# Patient Record
Sex: MALE | Race: WHITE | NOT HISPANIC OR LATINO | URBAN - METROPOLITAN AREA
[De-identification: names, ages, dates, MRNs, and addresses within clinical notes are randomized per-mention and may not be internally consistent; named-entity substitution may affect disease eponyms.]

---

## 2018-04-11 ENCOUNTER — INPATIENT (INPATIENT)
Facility: HOSPITAL | Age: 83
LOS: 4 days | Discharge: HOSPICE | DRG: 871 | End: 2018-04-16
Attending: INTERNAL MEDICINE | Admitting: INTERNAL MEDICINE
Payer: MEDICARE

## 2018-04-11 VITALS
HEART RATE: 102 BPM | SYSTOLIC BLOOD PRESSURE: 106 MMHG | OXYGEN SATURATION: 99 % | RESPIRATION RATE: 18 BRPM | TEMPERATURE: 98 F | DIASTOLIC BLOOD PRESSURE: 70 MMHG

## 2018-04-11 DIAGNOSIS — Z29.9 ENCOUNTER FOR PROPHYLACTIC MEASURES, UNSPECIFIED: ICD-10-CM

## 2018-04-11 DIAGNOSIS — R94.31 ABNORMAL ELECTROCARDIOGRAM [ECG] [EKG]: ICD-10-CM

## 2018-04-11 DIAGNOSIS — A41.9 SEPSIS, UNSPECIFIED ORGANISM: ICD-10-CM

## 2018-04-11 DIAGNOSIS — R63.8 OTHER SYMPTOMS AND SIGNS CONCERNING FOOD AND FLUID INTAKE: ICD-10-CM

## 2018-04-11 DIAGNOSIS — F03.90 UNSPECIFIED DEMENTIA WITHOUT BEHAVIORAL DISTURBANCE: ICD-10-CM

## 2018-04-11 DIAGNOSIS — J18.9 PNEUMONIA, UNSPECIFIED ORGANISM: ICD-10-CM

## 2018-04-11 DIAGNOSIS — R29.6 REPEATED FALLS: ICD-10-CM

## 2018-04-11 LAB
ALBUMIN SERPL ELPH-MCNC: 2.9 G/DL — LOW (ref 3.3–5)
ALP SERPL-CCNC: 88 U/L — SIGNIFICANT CHANGE UP (ref 40–120)
ALT FLD-CCNC: 16 U/L — SIGNIFICANT CHANGE UP (ref 10–45)
ANION GAP SERPL CALC-SCNC: 12 MMOL/L — SIGNIFICANT CHANGE UP (ref 5–17)
ANISOCYTOSIS BLD QL: SLIGHT — SIGNIFICANT CHANGE UP
APTT BLD: 29.7 SEC — SIGNIFICANT CHANGE UP (ref 27.5–37.4)
AST SERPL-CCNC: 16 U/L — SIGNIFICANT CHANGE UP (ref 10–40)
BASOPHILS NFR BLD AUTO: 0 % — SIGNIFICANT CHANGE UP (ref 0–2)
BILIRUB SERPL-MCNC: 0.8 MG/DL — SIGNIFICANT CHANGE UP (ref 0.2–1.2)
BUN SERPL-MCNC: 29 MG/DL — HIGH (ref 7–23)
CALCIUM SERPL-MCNC: 9.5 MG/DL — SIGNIFICANT CHANGE UP (ref 8.4–10.5)
CHLORIDE SERPL-SCNC: 104 MMOL/L — SIGNIFICANT CHANGE UP (ref 96–108)
CO2 SERPL-SCNC: 25 MMOL/L — SIGNIFICANT CHANGE UP (ref 22–31)
CREAT SERPL-MCNC: 0.89 MG/DL — SIGNIFICANT CHANGE UP (ref 0.5–1.3)
EOSINOPHIL NFR BLD AUTO: 0 % — SIGNIFICANT CHANGE UP (ref 0–6)
EXTRA SST TUBE: SIGNIFICANT CHANGE UP
GIANT PLATELETS BLD QL SMEAR: PRESENT — SIGNIFICANT CHANGE UP
GLUCOSE SERPL-MCNC: 116 MG/DL — HIGH (ref 70–99)
HCT VFR BLD CALC: 42.9 % — SIGNIFICANT CHANGE UP (ref 39–50)
HGB BLD-MCNC: 13.7 G/DL — SIGNIFICANT CHANGE UP (ref 13–17)
INR BLD: 1.33 — HIGH (ref 0.88–1.16)
LG PLATELETS BLD QL AUTO: PRESENT — SIGNIFICANT CHANGE UP
LYMPHOCYTES # BLD AUTO: 18 % — SIGNIFICANT CHANGE UP (ref 13–44)
MACROCYTES BLD QL: SLIGHT — SIGNIFICANT CHANGE UP
MANUAL DIF COMMENT BLD-IMP: SIGNIFICANT CHANGE UP
MANUAL SMEAR VERIFICATION: SIGNIFICANT CHANGE UP
MCHC RBC-ENTMCNC: 29.2 PG — SIGNIFICANT CHANGE UP (ref 27–34)
MCHC RBC-ENTMCNC: 31.9 G/DL — LOW (ref 32–36)
MCV RBC AUTO: 91.5 FL — SIGNIFICANT CHANGE UP (ref 80–100)
METAMYELOCYTES # FLD: 1 % — HIGH
MICROCYTES BLD QL: SLIGHT — SIGNIFICANT CHANGE UP
MONOCYTES NFR BLD AUTO: 6 % — SIGNIFICANT CHANGE UP (ref 2–14)
NEUTROPHILS NFR BLD AUTO: 38 % — LOW (ref 43–77)
NEUTS BAND # BLD: 37 % — HIGH
OVALOCYTES BLD QL SMEAR: SLIGHT — SIGNIFICANT CHANGE UP
PLAT MORPH BLD: (no result)
PLATELET # BLD AUTO: 202 K/UL — SIGNIFICANT CHANGE UP (ref 150–400)
POIKILOCYTOSIS BLD QL AUTO: SLIGHT — SIGNIFICANT CHANGE UP
POLYCHROMASIA BLD QL SMEAR: SLIGHT — SIGNIFICANT CHANGE UP
POTASSIUM SERPL-MCNC: 4.8 MMOL/L — SIGNIFICANT CHANGE UP (ref 3.5–5.3)
POTASSIUM SERPL-SCNC: 4.8 MMOL/L — SIGNIFICANT CHANGE UP (ref 3.5–5.3)
PROT SERPL-MCNC: 6.8 G/DL — SIGNIFICANT CHANGE UP (ref 6–8.3)
PROTHROM AB SERPL-ACNC: 14.8 SEC — HIGH (ref 9.8–12.7)
RAPID RVP RESULT: SIGNIFICANT CHANGE UP
RBC # BLD: 4.69 M/UL — SIGNIFICANT CHANGE UP (ref 4.2–5.8)
RBC # FLD: 15.3 % — SIGNIFICANT CHANGE UP (ref 10.3–16.9)
RBC BLD AUTO: (no result)
SODIUM SERPL-SCNC: 141 MMOL/L — SIGNIFICANT CHANGE UP (ref 135–145)
WBC # BLD: 8.2 K/UL — SIGNIFICANT CHANGE UP (ref 3.8–10.5)
WBC # FLD AUTO: 8.2 K/UL — SIGNIFICANT CHANGE UP (ref 3.8–10.5)

## 2018-04-11 PROCEDURE — 99223 1ST HOSP IP/OBS HIGH 75: CPT | Mod: GC

## 2018-04-11 PROCEDURE — 71046 X-RAY EXAM CHEST 2 VIEWS: CPT | Mod: 26

## 2018-04-11 PROCEDURE — 99285 EMERGENCY DEPT VISIT HI MDM: CPT | Mod: 25

## 2018-04-11 PROCEDURE — 93010 ELECTROCARDIOGRAM REPORT: CPT

## 2018-04-11 RX ORDER — AMPICILLIN SODIUM AND SULBACTAM SODIUM 250; 125 MG/ML; MG/ML
1.5 INJECTION, POWDER, FOR SUSPENSION INTRAMUSCULAR; INTRAVENOUS ONCE
Qty: 0 | Refills: 0 | Status: COMPLETED | OUTPATIENT
Start: 2018-04-11 | End: 2018-04-11

## 2018-04-11 RX ORDER — SODIUM CHLORIDE 9 MG/ML
1000 INJECTION INTRAMUSCULAR; INTRAVENOUS; SUBCUTANEOUS ONCE
Qty: 0 | Refills: 0 | Status: COMPLETED | OUTPATIENT
Start: 2018-04-11 | End: 2018-04-11

## 2018-04-11 RX ORDER — AMPICILLIN SODIUM AND SULBACTAM SODIUM 250; 125 MG/ML; MG/ML
1.5 INJECTION, POWDER, FOR SUSPENSION INTRAMUSCULAR; INTRAVENOUS EVERY 6 HOURS
Qty: 0 | Refills: 0 | Status: DISCONTINUED | OUTPATIENT
Start: 2018-04-12 | End: 2018-04-12

## 2018-04-11 RX ORDER — AMPICILLIN SODIUM AND SULBACTAM SODIUM 250; 125 MG/ML; MG/ML
INJECTION, POWDER, FOR SUSPENSION INTRAMUSCULAR; INTRAVENOUS
Qty: 0 | Refills: 0 | Status: DISCONTINUED | OUTPATIENT
Start: 2018-04-11 | End: 2018-04-12

## 2018-04-11 RX ORDER — CEFTRIAXONE 500 MG/1
1 INJECTION, POWDER, FOR SOLUTION INTRAMUSCULAR; INTRAVENOUS ONCE
Qty: 0 | Refills: 0 | Status: COMPLETED | OUTPATIENT
Start: 2018-04-11 | End: 2018-04-11

## 2018-04-11 RX ORDER — SODIUM CHLORIDE 9 MG/ML
1000 INJECTION INTRAMUSCULAR; INTRAVENOUS; SUBCUTANEOUS
Qty: 0 | Refills: 0 | Status: DISCONTINUED | OUTPATIENT
Start: 2018-04-11 | End: 2018-04-12

## 2018-04-11 RX ORDER — AZITHROMYCIN 500 MG/1
500 TABLET, FILM COATED ORAL ONCE
Qty: 0 | Refills: 0 | Status: COMPLETED | OUTPATIENT
Start: 2018-04-11 | End: 2018-04-11

## 2018-04-11 RX ORDER — HEPARIN SODIUM 5000 [USP'U]/ML
5000 INJECTION INTRAVENOUS; SUBCUTANEOUS EVERY 8 HOURS
Qty: 0 | Refills: 0 | Status: DISCONTINUED | OUTPATIENT
Start: 2018-04-11 | End: 2018-04-16

## 2018-04-11 RX ADMIN — HEPARIN SODIUM 5000 UNIT(S): 5000 INJECTION INTRAVENOUS; SUBCUTANEOUS at 22:29

## 2018-04-11 RX ADMIN — CEFTRIAXONE 100 GRAM(S): 500 INJECTION, POWDER, FOR SOLUTION INTRAMUSCULAR; INTRAVENOUS at 14:01

## 2018-04-11 RX ADMIN — AMPICILLIN SODIUM AND SULBACTAM SODIUM 100 GRAM(S): 250; 125 INJECTION, POWDER, FOR SUSPENSION INTRAMUSCULAR; INTRAVENOUS at 22:29

## 2018-04-11 RX ADMIN — SODIUM CHLORIDE 2000 MILLILITER(S): 9 INJECTION INTRAMUSCULAR; INTRAVENOUS; SUBCUTANEOUS at 15:45

## 2018-04-11 RX ADMIN — AZITHROMYCIN 255 MILLIGRAM(S): 500 TABLET, FILM COATED ORAL at 14:34

## 2018-04-11 RX ADMIN — SODIUM CHLORIDE 125 MILLILITER(S): 9 INJECTION INTRAMUSCULAR; INTRAVENOUS; SUBCUTANEOUS at 21:15

## 2018-04-11 RX ADMIN — SODIUM CHLORIDE 2000 MILLILITER(S): 9 INJECTION INTRAMUSCULAR; INTRAVENOUS; SUBCUTANEOUS at 13:53

## 2018-04-11 NOTE — H&P ADULT - NSHPPHYSICALEXAM_GEN_ALL_CORE
.  VITAL SIGNS:  T(F): 99.8 (04-11-18 @ 17:30), Max: 99.8 (04-11-18 @ 17:30)  HR: 77 (04-11-18 @ 17:30) (71 - 102)  BP: 103/65 (04-11-18 @ 17:30) (103/65 - 114/71)  BP(mean): --  RR: 14 (04-11-18 @ 17:30) (14 - 18)  SpO2: 94% (04-11-18 @ 17:30) (92% - 99%)    PHYSICAL EXAM:    Constitutional: Cachectic, frail, elderly male resting comfortably in bed; NAD  HEENT: NC/AT, PERRL, EOMI, anicteric sclera, arcus senalis, no nasal discharge; uvula midline, no oropharyngeal erythema or exudates; MMM  Neck: supple; no JVD or thyromegaly  Respiratory: crackles noted in R lower lung field, otherwise clear to auscultation bilaterally with good air movement, no accessory muscle use, no retractions  Cardiac: +S1/S2; RRR; no M/R/G; PMI non-displaced  Gastrointestinal: soft, NT/ND; no rebound or guarding; +BSx4  Genitourinary: normal external genitalia  Back: spine midline, no bony tenderness or step-offs; no CVAT B/L  Extremities: WWP, no clubbing or cyanosis; no peripheral edema  Musculoskeletal: NROM x4 passive, patient does not follow full commands; no joint swelling, tenderness or erythema  Vascular: 2+ radial, femoral, DP/PT pulses B/L  Dermatologic: skin warm, dry and intact; no rashes +wound on r knee clean, multiple bruises without palpable ecchymosis  Lymphatic: no submandibular or cervical LAD  Neurologic: AAOx0, grunting responses, CNII-XII grossly intact; no focal deficits; lethargic

## 2018-04-11 NOTE — ED PROVIDER NOTE - MEDICAL DECISION MAKING DETAILS
Pt c/o cough/sob/pleuritic cp w rll infiltrate on cxr at urgent care.  Pt appears dehydrated, mildly tachypneic.  Plan labs, cxr disc sent to rad for upload, abx/ivf ordered; likely admit.

## 2018-04-11 NOTE — ED PROVIDER NOTE - OBJECTIVE STATEMENT
87 yo male h/o dementia c/o cough x several days.  Pt went to urgent care and had cxr w rll pna, sent to ed for eval and admission.  Per hha and daughter, pt w sputum but is not coughing it out.  No fever, uri sx, sick contacts, travel. + increased weakness and less interactivity than baseline.  Nl po's.  C/o cp when coughing.  No ha, abd pain, n/v/d, dysuria.  HHA concerned pt not able to take po meds but has been taking his trazadone and haldol w/o difficulty.  Pt given ceftriaxone 1 gm IM at urgent care and rx for cefdinir and azithromycin.

## 2018-04-11 NOTE — H&P ADULT - PROBLEM SELECTOR PLAN 1
Patient with productive cough for three days, change in mental status, and bandemia to 37% and CXR with RLL consolidation suspicious for pneumonia on my read. Patient has some clearing of throat with food per daughter and HHA, possible aspiration with workup as below. DDX also includes viral (unliklely given negative RVP), advancing dementia, or underlying malignancy given weight loss. However given changing diet 2/2 dysphagia and possible aspiration, lethargy and weight loss are liklely linked. Will treat for pneumonia and monitor patient overnight for convalesence,   - Unasyn 1.5 gm IV q6hrs  - Aspiration precautions Patient with productive cough for three days, change in mental status, and bandemia to 37% and CXR with RLL consolidation suspicious for pneumonia on my read. Patient has some clearing of throat with food per daughter and HHA, possible aspiration with workup as below. DDX also includes viral (unliklely given negative RVP), advancing dementia, or underlying malignancy given weight loss. However given changing diet 2/2 dysphagia and possible aspiration, lethargy and weight loss are likely linked. Will treat for pneumonia and monitor patient overnight for convalescence. s/p Ceftriaxone IM 1gm at urgent care, then ceftriaxone 1gm and azithro 500 IV in ED  - Unasyn 1.5 gm IV q6hrs  - Aspiration precautions 2/4 SIRS criteria (bandemia, tachycardia), with pneumonia on hx and cxr. Patient with productive cough for three days, change in mental status, and bandemia to 37% and CXR with RLL consolidation suspicious for pneumonia on my read. Patient has some clearing of throat with food per daughter and HHA, possible aspiration with workup as below. DDX also includes viral (unliklely given negative RVP), advancing dementia, or underlying malignancy given weight loss. However given changing diet 2/2 dysphagia and possible aspiration, lethargy and weight loss are likely linked. Will treat for pneumonia and monitor patient overnight for convalescence. s/p Ceftriaxone IM 1gm at urgent care, then ceftriaxone 1gm and azithro 500 IV in ED  - Unasyn 1.5 gm IV q6hrs  - Aspiration precautions

## 2018-04-11 NOTE — H&P ADULT - PROBLEM SELECTOR PROBLEM 5
Need for prophylactic measure Nutrition, metabolism, and development symptoms R/O Aspiration of food

## 2018-04-11 NOTE — H&P ADULT - PROBLEM SELECTOR PLAN 5
Padua score 5, chemical ppx indicated, Heparin SQ 5000units Q8hrs  No indication for stress ulcer ppx at this time    Dispo: Admit to [facility] Padua score 5, chemical ppx indicated, Heparin SQ 5000units Q8hrs  No indication for stress ulcer ppx at this time    FULL CODE - confirmed with HCP daughter Jane Logan [phone]    Dispo: Admit to [facility] NPO awaiting s/s eval  Continue NS at 125 for hydration  Replete lytes to keep K>4 and Mg>2 Patient with history of clearing throat and coughing when eating, holding food in mouth, only able to drink thin liquids and raw fish. Concern for aspiration  - NPO until S/S eval  - f/u S/S recs  - Aspiration precautions

## 2018-04-11 NOTE — H&P ADULT - PROBLEM SELECTOR PLAN 4
NPO awaiting s/s eval  Continue NS at 125 for hydration  Replete lytes to keep K>4 and Mg>2 Fall precautions, PT as above. Fall precautions, PT as above.  - f/u CT Head  - f/u B12, rpr, TSH, t4 Patient with history of advanced dementia, baseline interactive, stands and walks with assistance, has been lethargic lately. AOx0 per daughter and HHA  - home meds of Trazadone and haldol being held for current lethargy  - check EKG for qtc prolongation  - Fall precautions  - Low threshold for enhanced obs if patient is attempting to get up  - PT eval  - Palliative consult for advanced dementia  - vitamin D level in AM, replete with high dose if low otherwise will start PO vitamin D if pass s/s eval

## 2018-04-11 NOTE — H&P ADULT - PROBLEM SELECTOR PLAN 6
Padua score 5, chemical ppx indicated, Heparin SQ 5000units Q8hrs  No indication for stress ulcer ppx at this time    FULL CODE - confirmed with HCP daughter Jane Logan [phone]    Dispo: Admit to [facility] Padua score 5, chemical ppx indicated, if CT head clear Heparin SQ 5000units Q8hrs  No indication for stress ulcer ppx at this time    FULL CODE - confirmed with HCP daughter Jane Logan [phone]    Dispo: Admit to [facility] Fall precautions, PT as above.  - f/u CT Head  - f/u B12, rpr, TSH, t4

## 2018-04-11 NOTE — H&P ADULT - PROBLEM SELECTOR PLAN 8
Padua score 5, chemical ppx indicated, if CT head clear Heparin SQ 5000units Q8hrs  No indication for stress ulcer ppx at this time    FULL CODE - confirmed with HCP daughter Jane Logan [phone]    Dispo: Admit to [facility]

## 2018-04-11 NOTE — ED PROVIDER NOTE - NEURO NEGATIVE STATEMENT, MLM
no loss of consciousness, no gait abnormality, no headache, no sensory deficits, and + generalized weakness. hpi

## 2018-04-11 NOTE — H&P ADULT - PROBLEM SELECTOR PLAN 3
Patient with history of clearing throat and coughing when eating, holding food in mouth, only able to drink thin liquids and raw fish. Concern for aspiration  - NPO until S/S eval  - f/u S/S recs  - Aspiration precautions As above

## 2018-04-11 NOTE — H&P ADULT - PROBLEM SELECTOR PLAN 2
Patient with history of advanced dementia, baseline interactive, stands and walks with assistance, has been lethargic lately. AOx0 per daughter and HHA  - home meds of Trazadone and haldol being held for current lethargy  - check EKG for qtc prolongation Patient with history of advanced dementia, baseline interactive, stands and walks with assistance, has been lethargic lately. AOx0 per daughter and HHA  - home meds of Trazadone and haldol being held for current lethargy  - check EKG for qtc prolongation  - Fall precautions  - Low threshold for enhanced obs if patient is attempting to get up  - PT eval I have read the EKG as follows: Sinus rhythm with widened qrs likely bifascicular block, t wave in v1 only noted, no signs of prior ischemia such as t waves or st depressions, no signs of current ischemia.

## 2018-04-11 NOTE — H&P ADULT - ASSESSMENT
Patient is an 89 yo M with history of advanced dementia who presents with cough, admitted with concerns of pneumonia vs aspiration pneumonia

## 2018-04-11 NOTE — H&P ADULT - HISTORY OF PRESENT ILLNESS
*History provided by daughter and HHA at bedside. Patient is an 89 yo M with history of advanced dementia, *History provided by daughter and HHA at bedside. Patient is an 87 yo M with history of advanced dementia who presents with several days of cough and lethargy. At baseline patient has lucid moments rarely, but is interactive and walks with assistance (does not speak anymore), and for the past three or four days has been lethargic and not as interactive, and with cough for the same amount of time. Today the patient was seen to be sleeping most of the day and having more productive cough so was brought to urgent care which then sent patient to ED for infiltrates suspicious for PNA with AMS. Patient otherwise was in usual state of health, although it was noted by daughter and HHA that patient has lost a lot of weight recently, now looks much thinner than 2-4 weeks ago, and has been falling more often. Patient has not had any head injuries but has had multiple bumps and has had a wound on his right knee that has not yet healed. Otherwise ROS per HHA and daughter is negative. *History provided by daughter and HHA at bedside. Patient is an 89 yo M with history of advanced dementia who presents with several days of cough and lethargy. At baseline patient has lucid moments rarely, but is interactive and walks with assistance (does not speak anymore), and for the past three or four days has been lethargic and not as interactive, and with cough for the same amount of time. Today the patient was seen to be sleeping most of the day and having more productive cough so was brought to urgent care which then sent patient to ED for infiltrates suspicious for PNA with AMS. Patient otherwise was in usual state of health, although it was noted by daughter and HHA that patient has lost a lot of weight recently, now looks much thinner than 2-4 weeks ago, and has been falling more often. Patient has not had any head injuries but has had multiple bumps and has had a wound on his right knee that has not yet healed. Otherwise ROS per HHA and daughter is negative. S/p 1gm IM ceftriaxone at urgent care center.     ED vitals: T(F): 99.8 HR: 77 BP: 103/65  RR: 14SpO2: 94%   ED administration: Ceftriaxone 1gm IV, Azithromycin 500mg IV. 2L NS bolus

## 2018-04-11 NOTE — PATIENT PROFILE ADULT. - VISION (WITH CORRECTIVE LENSES IF THE PATIENT USUALLY WEARS THEM):
Severely impaired: cannot locate objects without hearing or touching them or patient nonresponsive./Unable to assess. Patient lethargic upon admission to unit

## 2018-04-11 NOTE — PATIENT PROFILE ADULT. - FUNCTIONAL SCREEN CURRENT LEVEL: COMMUNICATION, MLM
(3) unable to understand or speak (not related to language barrier)/Unable to assess. Patient lethargic upon admission to unit

## 2018-04-11 NOTE — H&P ADULT - NSHPLABSRESULTS_GEN_ALL_CORE
.  LABS:                         13.7   8.2   )-----------( 202      ( 11 Apr 2018 13:36 )             42.9     04-11    141  |  104  |  29<H>  ----------------------------<  116<H>  4.8   |  25  |  0.89    Ca    9.5      11 Apr 2018 13:36    TPro  6.8  /  Alb  2.9<L>  /  TBili  0.8  /  DBili  x   /  AST  16  /  ALT  16  /  AlkPhos  88  04-11    PT/INR - ( 11 Apr 2018 13:36 )   PT: 14.8 sec;   INR: 1.33          PTT - ( 11 Apr 2018 13:36 )  PTT:29.7 sec              RADIOLOGY, EKG & ADDITIONAL TESTS:   Outside CXR reviewed by myself: PA view: no cardiomegaly, left hemidiaphragm elevated with stomach bubble and bowel seen beneath no free air. Enlarged mediastinum. RLL consolidation.

## 2018-04-11 NOTE — ED ADULT TRIAGE NOTE - OTHER COMPLAINTS
pt with productive cough since yesterday. pt had episode of chest pain this morning. hx of dementia, nonverbal at times, appears weak per family.

## 2018-04-11 NOTE — ED ADULT NURSE NOTE - OBJECTIVE STATEMENT
referred from urgent care post CXR which showed right lung base infiltrate, pt unwell x 4 days with expectorant cough ,SOB and weakness referred from urgent care post CXR which showed right lung base infiltrate, pt unwell x 4 days with expectorant cough ,SOB , chest pain and weakness

## 2018-04-11 NOTE — H&P ADULT - ATTENDING COMMENTS
Pt seen and examined at bedside on 4/11/2018 @ 2100    Agree with HPI, ROS as above.     VS, Labs, FH, SH, allergies, medications, imaging reviewed. I personally reviewed the patient's CXR - RLL infiltrate. 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    **Sepsis  -Pt meeting 2/4 SIRS criteria HR, bandemia  -Will get patient weight to see if appropriately fluid resuscitated  -No LA drawn in ED  -Will f/u BCx  -Concern for aspiration - will switch abx to Unasyn  -Tylenol PRN for fever  -RVP negative    Plan otherwise as outlined above..... Pt seen and examined at bedside on 4/11/2018 @ 2100    Agree with HPI, ROS as above.     VS, Labs, FH, SH, allergies, medications, imaging reviewed. I personally reviewed the patient's CXR - RLL infiltrate. Agree with physical exam as above     A/P: Patient is an 89 yo M with history of advanced dementia who presents with cough, admitted with concerns of pneumonia vs aspiration pneumonia    **Sepsis  -Pt meeting 2/4 SIRS criteria HR, bandemia  -Will get patient weight to see if appropriately fluid resuscitated  -No LA drawn in ED  -Will f/u BCx  -Concern for aspiration - will switch abx to Unasyn  -Tylenol PRN for fever  -RVP negative    Plan otherwise as outlined above.....

## 2018-04-12 DIAGNOSIS — Z71.89 OTHER SPECIFIED COUNSELING: ICD-10-CM

## 2018-04-12 DIAGNOSIS — T17.890A OTHER FOREIGN OBJECT IN OTHER PARTS OF RESPIRATORY TRACT CAUSING ASPHYXIATION, INITIAL ENCOUNTER: ICD-10-CM

## 2018-04-12 DIAGNOSIS — J69.0 PNEUMONITIS DUE TO INHALATION OF FOOD AND VOMIT: ICD-10-CM

## 2018-04-12 DIAGNOSIS — G30.9 ALZHEIMER'S DISEASE, UNSPECIFIED: ICD-10-CM

## 2018-04-12 DIAGNOSIS — R06.82 TACHYPNEA, NOT ELSEWHERE CLASSIFIED: ICD-10-CM

## 2018-04-12 DIAGNOSIS — Z51.5 ENCOUNTER FOR PALLIATIVE CARE: ICD-10-CM

## 2018-04-12 LAB
24R-OH-CALCIDIOL SERPL-MCNC: 33.6 NG/ML — SIGNIFICANT CHANGE UP (ref 30–80)
ALBUMIN SERPL ELPH-MCNC: 2.3 G/DL — LOW (ref 3.3–5)
ALP SERPL-CCNC: 71 U/L — SIGNIFICANT CHANGE UP (ref 40–120)
ALT FLD-CCNC: 11 U/L — SIGNIFICANT CHANGE UP (ref 10–45)
ANION GAP SERPL CALC-SCNC: 11 MMOL/L — SIGNIFICANT CHANGE UP (ref 5–17)
AST SERPL-CCNC: 15 U/L — SIGNIFICANT CHANGE UP (ref 10–40)
BASE EXCESS BLDA CALC-SCNC: -1.3 MMOL/L — SIGNIFICANT CHANGE UP (ref -2–3)
BASE EXCESS BLDA CALC-SCNC: -2.6 MMOL/L — LOW (ref -2–3)
BASE EXCESS BLDA CALC-SCNC: -2.8 MMOL/L — LOW (ref -2–3)
BASOPHILS NFR BLD AUTO: 0.2 % — SIGNIFICANT CHANGE UP (ref 0–2)
BILIRUB SERPL-MCNC: 0.7 MG/DL — SIGNIFICANT CHANGE UP (ref 0.2–1.2)
BUN SERPL-MCNC: 21 MG/DL — SIGNIFICANT CHANGE UP (ref 7–23)
CALCIUM SERPL-MCNC: 8.6 MG/DL — SIGNIFICANT CHANGE UP (ref 8.4–10.5)
CHLORIDE SERPL-SCNC: 109 MMOL/L — HIGH (ref 96–108)
CO2 SERPL-SCNC: 22 MMOL/L — SIGNIFICANT CHANGE UP (ref 22–31)
CREAT SERPL-MCNC: 0.67 MG/DL — SIGNIFICANT CHANGE UP (ref 0.5–1.3)
GAS PNL BLDA: SIGNIFICANT CHANGE UP
GLUCOSE SERPL-MCNC: 81 MG/DL — SIGNIFICANT CHANGE UP (ref 70–99)
HCO3 BLDA-SCNC: 17 MMOL/L — LOW (ref 21–28)
HCO3 BLDA-SCNC: 18 MMOL/L — LOW (ref 21–28)
HCO3 BLDA-SCNC: 20 MMOL/L — LOW (ref 21–28)
HCT VFR BLD CALC: 39 % — SIGNIFICANT CHANGE UP (ref 39–50)
HGB BLD-MCNC: 12.5 G/DL — LOW (ref 13–17)
LACTATE SERPL-SCNC: 1.2 MMOL/L — SIGNIFICANT CHANGE UP (ref 0.5–2)
LACTATE SERPL-SCNC: 2.6 MMOL/L — HIGH (ref 0.5–2)
LEGIONELLA AG UR QL: NEGATIVE — SIGNIFICANT CHANGE UP
LYMPHOCYTES # BLD AUTO: 29.1 % — SIGNIFICANT CHANGE UP (ref 13–44)
MAGNESIUM SERPL-MCNC: 2.1 MG/DL — SIGNIFICANT CHANGE UP (ref 1.6–2.6)
MCHC RBC-ENTMCNC: 29.3 PG — SIGNIFICANT CHANGE UP (ref 27–34)
MCHC RBC-ENTMCNC: 32.1 G/DL — SIGNIFICANT CHANGE UP (ref 32–36)
MCV RBC AUTO: 91.3 FL — SIGNIFICANT CHANGE UP (ref 80–100)
MONOCYTES NFR BLD AUTO: 8.1 % — SIGNIFICANT CHANGE UP (ref 2–14)
NEUTROPHILS NFR BLD AUTO: 62.6 % — SIGNIFICANT CHANGE UP (ref 43–77)
PCO2 BLDA: 19 MMHG — LOW (ref 35–48)
PCO2 BLDA: 21 MMHG — LOW (ref 35–48)
PCO2 BLDA: 28 MMHG — LOW (ref 35–48)
PH BLDA: 7.47 — HIGH (ref 7.35–7.45)
PH BLDA: 7.56 — HIGH (ref 7.35–7.45)
PH BLDA: 7.57 — HIGH (ref 7.35–7.45)
PHOSPHATE SERPL-MCNC: 2.1 MG/DL — LOW (ref 2.5–4.5)
PLATELET # BLD AUTO: 186 K/UL — SIGNIFICANT CHANGE UP (ref 150–400)
PO2 BLDA: 108 MMHG — SIGNIFICANT CHANGE UP (ref 83–108)
PO2 BLDA: 124 MMHG — HIGH (ref 83–108)
PO2 BLDA: 91 MMHG — SIGNIFICANT CHANGE UP (ref 83–108)
POTASSIUM SERPL-MCNC: 3.7 MMOL/L — SIGNIFICANT CHANGE UP (ref 3.5–5.3)
POTASSIUM SERPL-SCNC: 3.7 MMOL/L — SIGNIFICANT CHANGE UP (ref 3.5–5.3)
PREALB SERPL-MCNC: 4 MG/DL — LOW (ref 20–40)
PROT SERPL-MCNC: 5.6 G/DL — LOW (ref 6–8.3)
RBC # BLD: 4.27 M/UL — SIGNIFICANT CHANGE UP (ref 4.2–5.8)
RBC # FLD: 15.2 % — SIGNIFICANT CHANGE UP (ref 10.3–16.9)
SAO2 % BLDA: 98 % — SIGNIFICANT CHANGE UP (ref 95–100)
SAO2 % BLDA: 98 % — SIGNIFICANT CHANGE UP (ref 95–100)
SAO2 % BLDA: 99 % — SIGNIFICANT CHANGE UP (ref 95–100)
SODIUM SERPL-SCNC: 142 MMOL/L — SIGNIFICANT CHANGE UP (ref 135–145)
T4 AB SER-ACNC: 4.6 UG/DL — SIGNIFICANT CHANGE UP (ref 3.17–11.72)
TSH SERPL-MCNC: 0.7 UIU/ML — SIGNIFICANT CHANGE UP (ref 0.35–4.94)
WBC # BLD: 5.4 K/UL — SIGNIFICANT CHANGE UP (ref 3.8–10.5)
WBC # FLD AUTO: 5.4 K/UL — SIGNIFICANT CHANGE UP (ref 3.8–10.5)

## 2018-04-12 PROCEDURE — 99233 SBSQ HOSP IP/OBS HIGH 50: CPT | Mod: GC

## 2018-04-12 PROCEDURE — 99223 1ST HOSP IP/OBS HIGH 75: CPT

## 2018-04-12 PROCEDURE — 71045 X-RAY EXAM CHEST 1 VIEW: CPT | Mod: 26,76

## 2018-04-12 RX ORDER — HALOPERIDOL DECANOATE 100 MG/ML
1 INJECTION INTRAMUSCULAR THREE TIMES A DAY
Qty: 0 | Refills: 0 | Status: DISCONTINUED | OUTPATIENT
Start: 2018-04-12 | End: 2018-04-13

## 2018-04-12 RX ORDER — HALOPERIDOL DECANOATE 100 MG/ML
0.5 INJECTION INTRAMUSCULAR THREE TIMES A DAY
Qty: 0 | Refills: 0 | Status: DISCONTINUED | OUTPATIENT
Start: 2018-04-12 | End: 2018-04-12

## 2018-04-12 RX ORDER — POTASSIUM PHOSPHATE, MONOBASIC POTASSIUM PHOSPHATE, DIBASIC 236; 224 MG/ML; MG/ML
15 INJECTION, SOLUTION INTRAVENOUS ONCE
Qty: 0 | Refills: 0 | Status: COMPLETED | OUTPATIENT
Start: 2018-04-12 | End: 2018-04-12

## 2018-04-12 RX ORDER — TRAZODONE HCL 50 MG
0 TABLET ORAL
Qty: 0 | Refills: 0 | COMMUNITY

## 2018-04-12 RX ORDER — QUETIAPINE FUMARATE 200 MG/1
25 TABLET, FILM COATED ORAL ONCE
Qty: 0 | Refills: 0 | Status: COMPLETED | OUTPATIENT
Start: 2018-04-12 | End: 2018-04-12

## 2018-04-12 RX ORDER — HALOPERIDOL DECANOATE 100 MG/ML
0 INJECTION INTRAMUSCULAR
Qty: 0 | Refills: 0 | COMMUNITY

## 2018-04-12 RX ORDER — SODIUM CHLORIDE 9 MG/ML
1000 INJECTION, SOLUTION INTRAVENOUS
Qty: 0 | Refills: 0 | Status: DISCONTINUED | OUTPATIENT
Start: 2018-04-12 | End: 2018-04-16

## 2018-04-12 RX ORDER — AMPICILLIN SODIUM AND SULBACTAM SODIUM 250; 125 MG/ML; MG/ML
3 INJECTION, POWDER, FOR SUSPENSION INTRAMUSCULAR; INTRAVENOUS EVERY 6 HOURS
Qty: 0 | Refills: 0 | Status: DISCONTINUED | OUTPATIENT
Start: 2018-04-12 | End: 2018-04-16

## 2018-04-12 RX ORDER — ACETAMINOPHEN 500 MG
1000 TABLET ORAL ONCE
Qty: 0 | Refills: 0 | Status: COMPLETED | OUTPATIENT
Start: 2018-04-12 | End: 2018-04-12

## 2018-04-12 RX ORDER — POTASSIUM CHLORIDE 20 MEQ
20 PACKET (EA) ORAL ONCE
Qty: 0 | Refills: 0 | Status: DISCONTINUED | OUTPATIENT
Start: 2018-04-12 | End: 2018-04-12

## 2018-04-12 RX ADMIN — HEPARIN SODIUM 5000 UNIT(S): 5000 INJECTION INTRAVENOUS; SUBCUTANEOUS at 05:14

## 2018-04-12 RX ADMIN — AMPICILLIN SODIUM AND SULBACTAM SODIUM 200 GRAM(S): 250; 125 INJECTION, POWDER, FOR SUSPENSION INTRAMUSCULAR; INTRAVENOUS at 19:03

## 2018-04-12 RX ADMIN — Medication 400 MILLIGRAM(S): at 17:35

## 2018-04-12 RX ADMIN — QUETIAPINE FUMARATE 25 MILLIGRAM(S): 200 TABLET, FILM COATED ORAL at 19:38

## 2018-04-12 RX ADMIN — HALOPERIDOL DECANOATE 0.5 MILLIGRAM(S): 100 INJECTION INTRAMUSCULAR at 12:51

## 2018-04-12 RX ADMIN — HEPARIN SODIUM 5000 UNIT(S): 5000 INJECTION INTRAVENOUS; SUBCUTANEOUS at 22:34

## 2018-04-12 RX ADMIN — SODIUM CHLORIDE 80 MILLILITER(S): 9 INJECTION, SOLUTION INTRAVENOUS at 22:34

## 2018-04-12 RX ADMIN — SODIUM CHLORIDE 125 MILLILITER(S): 9 INJECTION INTRAMUSCULAR; INTRAVENOUS; SUBCUTANEOUS at 07:05

## 2018-04-12 RX ADMIN — AMPICILLIN SODIUM AND SULBACTAM SODIUM 100 GRAM(S): 250; 125 INJECTION, POWDER, FOR SUSPENSION INTRAMUSCULAR; INTRAVENOUS at 04:48

## 2018-04-12 RX ADMIN — SODIUM CHLORIDE 80 MILLILITER(S): 9 INJECTION, SOLUTION INTRAVENOUS at 19:03

## 2018-04-12 RX ADMIN — HEPARIN SODIUM 5000 UNIT(S): 5000 INJECTION INTRAVENOUS; SUBCUTANEOUS at 13:14

## 2018-04-12 RX ADMIN — AMPICILLIN SODIUM AND SULBACTAM SODIUM 200 GRAM(S): 250; 125 INJECTION, POWDER, FOR SUSPENSION INTRAMUSCULAR; INTRAVENOUS at 13:14

## 2018-04-12 RX ADMIN — POTASSIUM PHOSPHATE, MONOBASIC POTASSIUM PHOSPHATE, DIBASIC 62.5 MILLIMOLE(S): 236; 224 INJECTION, SOLUTION INTRAVENOUS at 09:31

## 2018-04-12 RX ADMIN — HALOPERIDOL DECANOATE 1 MILLIGRAM(S): 100 INJECTION INTRAMUSCULAR at 19:38

## 2018-04-12 NOTE — CONSULT NOTE ADULT - PROBLEM SELECTOR RECOMMENDATION 9
awaiting sp/swallow eval, on day 2 of abx, continue with management by primary team.  d/w daughter on phone call: described etiology and chronicity of aspiration in ad. dementia.  She understands patient likely to reaspirate, and intubation will not fix that underlying diagnosis or illness trajectory and does not want to prolong his suffering with this disease. awaiting sp/swallow eval, on day 2 of abx, continue with management by primary team.  d/w daughter on phone call: described etiology and chronicity of aspiration in ad. dementia and Parkinson's symptoms.  She understands patient likely to re-aspirate, and intubation will not fix that underlying diagnosis or illness trajectory and does not want to prolong his suffering with this disease.  Official swallowing eval not yet done because of BiPAP, will be revisited tomorrow  Would avoid BiPAP because it has increased agitation and also adds to aspiration risk

## 2018-04-12 NOTE — PROGRESS NOTE ADULT - PROBLEM SELECTOR PLAN 2
patient was tachypneic to the 40's 4/12 AM, likely 2/2 pneumonia and possibly a new aspiration event overnight  -placed on bipap  -examined with senior resident; clear lungs  -ABG sent before bipap, specimen lost by lab  -ABG sent after bipap Co2 28, Po2>100  -lactate elevated to 2.6 likely 2/2 work of breathing, will repeat -F/U S&S, Unasyn 3G Q6H.  -Acute respiratory alkalosis - cont supportive measures.

## 2018-04-12 NOTE — PROGRESS NOTE ADULT - PROBLEM SELECTOR PLAN 3
I have read the EKG as follows: Sinus rhythm with widened qrs likely bifascicular block, t wave in v1 only noted, no signs of prior ischemia such as t waves or st depressions, no signs of current ischemia. Acute hypoxemic respiratory failure: patient was tachypneic to the 40's 4/12 AM, likely 2/2 pneumonia and possibly a new aspiration event overnight  -placed on bipap  -examined with senior resident; clear lungs  -ABG sent before bipap, specimen lost by lab  -ABG sent after bipap Co2 28, Po2>100  -lactate elevated to 2.6 likely 2/2 work of breathing, will repeat Acute hypoxemic respiratory failure: patient was tachypneic to the 40's 4/12 AM, likely 2/2 pneumonia and possibly a new aspiration event overnight  -placed on bipap  -examined with senior resident; clear lungs  -ABG show respiratory alkalosis  -lactate elevated to 2.6 in AM likely 2/2 work of breathing, will repeat  -now tachypnea thought to be 2/2 to anxiety and possibly pain, but patient unable to verbalize given baseline poor mental status.   -continue giving 1mg IV haldol [id number] as at home he has 1mg PO q 8 hours.   -give 1g IV tylenol to see if he relaxes as pain is another possible etiology

## 2018-04-12 NOTE — CONSULT NOTE ADULT - PROBLEM SELECTOR RECOMMENDATION 4
goals of care discussion initiated over phone.  Daughter states there are moments when patient is more lucid in recent years and he expressed he did not want to live like this.  She is torn because her son wants all aggressive measures, but she knows he " has been declining in last year" confirms DNR/I.  Earlier was deciding because of guilt and confusion by family pressures, but now confirms DNR/I as his disease will continue to produce sx that distress him. He displays paranoia over many things: believes his HHA of 25 years is poisoning him, etc. e discussion initiated over phone.  Daughter states there are moments when patient is more lucid in recent years and he expressed he did not want to live like this.  She is torn because her son wants all aggressive measures, but she knows he " has been declining in last year" confirms DNR/I.  Earlier was deciding because of guilt and confusion by family pressures, but now confirms DNR/I as his disease will continue to produce sx that distress him. He displays paranoia over many things: believes his HHA of 25 years is poisoning him, etc.    Parkinsons symptoms frquent falls to be addressed by [facility] psych if possible.  Strategies to reduce risk of aspiration to be addressed by speech and swallow.    Patient appears home hospice eligible based on marked decline in functional status over past two months, greater than 10% body weight loss over past 6-8 weeks with albumin of 2.3, aspiration pneumonia, progressive cognitive decline.  Will be discussed further with daughter

## 2018-04-12 NOTE — PROGRESS NOTE ADULT - PROBLEM SELECTOR PLAN 9
Padua score 5, chemical ppx indicated, if CT head clear Heparin SQ 5000units Q8hrs  No indication for stress ulcer ppx at this time    FULL CODE - confirmed with HCP daughter Jane Logan [phone]    Dispo: Admit to [facility] Padua score 5, chemical ppx indicated, Heparin SQ 5000units Q8hrs  No indication for stress ulcer ppx at this time  DNR/DNI- confirmed with HCP daughter Jane Logan [phone]  Dispo: Admit to [facility]

## 2018-04-12 NOTE — CONSULT NOTE ADULT - ASSESSMENT
89yo M with Advanced Dementia ( nonverbal, is ambulatory with assist), history of frequent falls, admitted for Aspriation pneumonia.  Palliative Care caleld to consult for goals of care 89yo M with Advanced Dementia ( nonverbal, is ambulatory with assist), history of frequent falls, admitted for Aspriation pneumonia.  Palliative Care called to consult for symptom management and goals of care    Michaelugher reports concern re frequent falls and "aspiration issues"

## 2018-04-12 NOTE — DIETITIAN INITIAL EVALUATION ADULT. - PROBLEM SELECTOR PLAN 8
Padua score 5, chemical ppx indicated, if CT head clear Heparin SQ 5000units Q8hrs  No indication for stress ulcer ppx at this time    FULL CODE - confirmed with HCP daughter Jane Logan [phone]    Dispo: Admit to [facility]

## 2018-04-12 NOTE — PROGRESS NOTE ADULT - PROBLEM SELECTOR PLAN 1
2/4 SIRS criteria (bandemia, tachycardia), with pneumonia on hx and cxr. Patient with productive cough for three days, change in mental status, and bandemia to 37% and CXR with RLL consolidation suspicious for pneumonia on my read. Patient has some clearing of throat with food per daughter and HHA, possible aspiration with workup as below. DDX also includes viral (unliklely given negative RVP), advancing dementia, or underlying malignancy given weight loss. However given changing diet 2/2 dysphagia and possible aspiration, lethargy and weight loss are likely linked. Will treat for pneumonia and monitor patient overnight for convalescence. s/p Ceftriaxone IM 1gm at urgent care, then ceftriaxone 1gm and azithro 500 IV in ED  - Unasyn 1.5 gm IV q6hrs  - Aspiration precautions, HOB >30 degrees 2/4 SIRS criteria (bandemia, tachycardia), with pneumonia on hx and cxr. Patient with productive cough for three days, change in mental status, and bandemia to 37% and CXR with RLL consolidation suspicious for pneumonia on my read. Patient has some clearing of throat with food per daughter and HHA, possible aspiration with workup as below. DDX also includes viral (unliklely given negative RVP), advancing dementia, or underlying malignancy given weight loss. However given changing diet 2/2 dysphagia and possible aspiration, lethargy and weight loss are likely linked. Will treat for pneumonia and monitor patient overnight for convalescence. s/p Ceftriaxone IM 1gm at urgent care, then ceftriaxone 1gm and azithro 500 IV in ED  - Unasyn 3 gm IV q6hrs  - Aspiration precautions, HOB >30 degrees

## 2018-04-12 NOTE — PROGRESS NOTE ADULT - SUBJECTIVE AND OBJECTIVE BOX
INTERVAL HPI/OVERNIGHT EVENTS: the patient was examined at bedside and was noted to be tachypneic to 40's around 8:00AM patient saturating okay, ABG was sent, but when the lab was called the specimen was lost. The patient started on bipap for work of breathing and the ABG was repeated while on bipap. Patient looked more comfortable on bipap (examined with senior resident at bedside).    VITAL SIGNS:  T(F): 97.8 (04-12-18 @ 08:55)  HR: 61 (04-12-18 @ 08:55)  BP: 120/80 (04-12-18 @ 08:55)  RR: 22 (04-12-18 @ 08:55)  SpO2: 96% (04-12-18 @ 08:55)  Wt(kg): --    PHYSICAL EXAM:    Constitutional: Well developed, well nourished  General: laying comfortably  ENMT: moist mucous membranes, no mucosal pallor, clear throat, uvula midline  Neck: supple  Respiratory: CTABL, no rales, no crackles, no wheezing  Cardiovascular: +S1, +S2, no murmurs, rubs or gallops, regular rate and rhythm  Gastrointestinal: abdomen soft, non distended, non tender, +BS  Extremities: no edema, no calf pain to palpation  Vascular: cap refill <3s in all extremities, radial and DP pulses 2+, cold hands   Neurological: AAO x1 knows his name is Musa and can say Musa, but not his last name  Skin: no rashes    MEDICATIONS  (STANDING):  ampicillin/sulbactam  IVPB      ampicillin/sulbactam  IVPB 1.5 Gram(s) IV Intermittent every 6 hours  heparin  Injectable 5000 Unit(s) SubCutaneous every 8 hours  potassium phosphate IVPB 15 milliMole(s) IV Intermittent once  sodium chloride 0.9%. 1000 milliLiter(s) (125 mL/Hr) IV Continuous <Continuous>    MEDICATIONS  (PRN):      Allergies    No Known Drug Allergies  Nuts (Anaphylaxis)  seeds (Anaphylaxis)    Intolerances        LABS:                        12.5   5.4   )-----------( 186      ( 12 Apr 2018 06:30 )             39.0     04-12    142  |  109<H>  |  21  ----------------------------<  81  3.7   |  22  |  0.67    Ca    8.6      12 Apr 2018 06:30  Phos  2.1     04-12  Mg     2.1     04-12    TPro  5.6<L>  /  Alb  2.3<L>  /  TBili  0.7  /  DBili  x   /  AST  15  /  ALT  11  /  AlkPhos  71  04-12    PT/INR - ( 11 Apr 2018 13:36 )   PT: 14.8 sec;   INR: 1.33          PTT - ( 11 Apr 2018 13:36 )  PTT:29.7 sec      RADIOLOGY & ADDITIONAL TESTS: Reviewed.

## 2018-04-12 NOTE — DIETITIAN INITIAL EVALUATION ADULT. - PROBLEM SELECTOR PLAN 5
Patient with history of clearing throat and coughing when eating, holding food in mouth, only able to drink thin liquids and raw fish. Concern for aspiration  - NPO until S/S eval  - f/u S/S recs  - Aspiration precautions

## 2018-04-12 NOTE — PROGRESS NOTE ADULT - PROBLEM SELECTOR PLAN 6
Patient with history of clearing throat and coughing when eating, holding food in mouth, only able to drink thin liquids and raw fish. Concern for aspiration  - NPO until S/S eval  - f/u S/S recs  - Aspiration precautions Patient with history of clearing throat and coughing when eating, holding food in mouth, only able to drink thin liquids and raw fish. Concern for aspiration  - NPO until S/S eval - s/s discussed with family today and likely aspirating on all consistencies. Patient was on bipap when they came, so they were unable to assess, will reassess tomorrow.   - f/u S/S recs  - Aspiration precautions

## 2018-04-12 NOTE — CONSULT NOTE ADULT - PROBLEM SELECTOR RECOMMENDATION 3
avoid sedatives and antipsychotics for agitation due to labile respiratory status and frequent falls, c/w supervision at all times and redirection.  At baseline patient does not follow commands, constantly tries to stand up and move, has frequent falls. avoid sedatives and antipsychotics for agitation due to labile respiratory status and frequent falls, c/w supervision at all times and redirection.  At baseline patient does not follow commands, constantly tries to stand up and move, has frequent falls.  Suggest nigel-psych consult to reassess psych med regimen

## 2018-04-12 NOTE — CONSULT NOTE ADULT - SUBJECTIVE AND OBJECTIVE BOX
KATIE LOZA          MRN-[id number]            ([id number])    CC: Cough with shortness of breath    HPI:  *History provided by daughter and HHA at bedside. Patient is an 87 yo M with history of advanced dementia who presents with several days of cough and lethargy. At baseline patient has lucid moments rarely, but is interactive and walks with assistance (does not speak anymore), and for the past three or four days has been lethargic and not as interactive, and with cough for the same amount of time. Today the patient was seen to be sleeping most of the day and having more productive cough so was brought to urgent care which then sent patient to ED for infiltrates suspicious for PNA with AMS. Patient otherwise was in usual state of health, although it was noted by daughter and HHA that patient has lost a lot of weight recently, now looks much thinner than 2-4 weeks ago, and has been falling more often. Patient has not had any head injuries but has had multiple bumps and has had a wound on his right knee that has not yet healed. Otherwise ROS per HHA and daughter is negative. S/p 1gm IM ceftriaxone at urgent care center. (2018 17:13)    INTERIM EVENTS: Patient admitted to medical service on 4 Uris, placed on Bipap and noted to need enhanced observation as cannot follow commands, repeatedly tries to get up out of bed.  Family still has HHA bedside while hospitalized as they know this is a longstanding problem and patient has hx of multiple falls.  Patient seen bedside: nonverbal, unable to follow command, granddaughter states his breathing is somewhat improved, they are waiting to do a trial off of bipap.  No acute events overnight as per staff and family.  Patient alert and active within the bed.     PAST MEDICAL & SURGICAL HISTORY:  Dementia  No significant past surgical history    FAMILY HISTORY:  No pertinent family history in first degree relatives    Reviewed and     SOCIAL HISTORY: Lives in private apt with 24/7 A for last 30 years.  (wife  from parkinsons) with 2 daughters: 1 is HCProxy (Jane Logan ) as per self report, no documentation available in chart  The second daughter lives in [place] and chooses to be uninvolved.  Patient was very active all his life: took extremely good care of his health, and is a retired  for [employer] retired 30 years prior) Has a grandson and granddaughter who he is very close to ( Jane's children)    ROS:    Unable to attain due to cognitive impairment nonverbal due to advanced dementia                    Dyspnea (Saloni 0-10):      2/10 while on Bipap                  N/V (Y/N):                            no indication is nauseous   Secretions (Y/N) :                  + cough  Agitation(Y/N):                      Y  Pain (Y/N):                            N  -Provocation/Palliation:  -Quality/Quantity:  -Radiating:  -Severity:  -Timing/Frequency:  -Impact on ADLs:    General:  unable due to cog impairment  HEENT:    unable due to cog impairment  Neck:  unable due to cog impairment  CVS:  unable due to cog impairment  Resp:  unable due to cog impairment  GI:  unable due to cog impairment  :  unable due to cog impairment  Musc:  unable due to cog impairment  Neuro:  unable due to cog impairment  Psych:  unable due to cog impairment  Skin:  unable due to cog impairment  Lymph:  unable due to cog impairment    Allergies  No Known Drug Allergies  Nuts (Anaphylaxis)  seeds (Anaphylaxis)    Intolerances        Opiate Naive (Y/N):  Yes is opiate naive  -iStop reviewed (Y/N): Reference #: [id number]   Medications:      MEDICATIONS  (STANDING):  ampicillin/sulbactam  IVPB 3 Gram(s) IV Intermittent every 6 hours  heparin  Injectable 5000 Unit(s) SubCutaneous every 8 hours  sodium chloride 0.9%. 1000 milliLiter(s) (125 mL/Hr) IV Continuous <Continuous>    MEDICATIONS  (PRN):  haloperidol    Injectable 0.5 milliGRAM(s) IV Push three times a day PRN agitation      Labs:    CBC:                        12.5   5.4   )-----------( 186      ( 2018 06:30 )             39.0     CMP:        142  |  109<H>  |  21  ----------------------------<  81  3.7   |  22  |  0.67    Ca    8.6      2018 06:30  Phos  2.1     04-12  Mg     2.1     04-12    TPro  5.6<L>  /  Alb  2.3<L>  /  TBili  0.7  /  DBili  x   /  AST  15  /  ALT  11  /  AlkPhos  71  04-12    PT/INR - ( 2018 13:36 )   PT: 14.8 sec;   INR: 1.33          PTT - ( 2018 13:36 )  PTT:29.7 sec      Imaging:  Reviewed  EXAM:  XR CHEST PORTABLE URGENT 1V                          PROCEDURE DATE:  2018      INTERPRETATION:  Portable chest    History: Tachypnea follow-up abnormal exam    Right pleural effusion/atelectasis and or infiltrates improving compared   to prior exam earlier same day. Right upper lung and left lung clear.       12 Lead ECG (18 @ 12:51)    Ventricular Rate 84 BPM    Atrial Rate 84 BPM    P-R Interval 136 ms    QRS Duration 132 ms    Q-T Interval 374 ms    QTC Calculation(Bezet) 441 ms    P Axis 61 degrees    R Axis 86 degrees    T Axis 54 degrees    Diagnosis Line Normal sinus rhythmwith sinus arrhythmia  Right bundle branch block  Nonspecific ST - T abnormalities    PEx:  T(C): 36.6 (18 @ 08:55), Max: 37.2 (18 @ 17:30)  HR: 61 (18 @ 08:55) (61 - 77)  BP: 120/80 (18 @ 08:55) (91/58 - 120/80)  RR: 22 (18 @ 08:55) (16 - 22)  SpO2: 96% (18 @ 08:55) (90% - 98%)  Wt(kg): 64kg  Daily       I&O's Summary    2018 07:  -  2018 07:00  --------------------------------------------------------  IN: 1425 mL / OUT: 0 mL / NET: 1425 mL    2018 07:  -  2018 13:59  --------------------------------------------------------  IN: 125 mL / OUT: 0 mL / NET: 125 mL      General:  alert, on guard (ready to move when allowed) in bed with bipap on  HEENT:  At/NC  Neck: no thryomegaly  CVS: s1s2  Resp: + Bipap, + rhonchi + cough, bilateral air entry  GI: + soft, + BS  : condom catheter  Musc:   strength 5/5 all 4 extermitites  Neuro: + dementia, nonverbal, no focal deficits   Psych:   agitated frequently, restless, not  Skin:   grossly intact, bruises noted from previous falls  Lymph: no gross adenopathy  Preadmit Karnofsky: 40 %           Current Karnofsky:  20 %  Cachexia (Y/N): Y  BMI: unknown, height required, but evidence of reduced SQ fat, frailty    Advanced Directives:     DNR/DNI - confirmed with daughter on phone this afternoon, documentation noted in chart confirming DNR/I from 1045am 18     [facility] - hope to complete after meeting HCP/family     DPOA - as per daughter Jane she is POA and HCP     Living Will - not known if specifically has a LWill    Decision maker:  PAtient does nto have capacity to make his own complex medical decisions as he has advanced dementia, nonverbal.  Legal surrogate:  Jane Logan, need to confirm with other daughter or with HCProxy paperwork review    GOALS OF CARE DISCUSSION       Palliative care info/counseling provided - telephone encounter in afternoon (approx 115-130)	           Family meeting - scheduled for  @1230p       Advanced Directives addressed    	          REFERRALS	        Palliative Med  - to evaluate       Unit SW/Case Mgmt              Speech/Swallow - pending       Patient/Family Support       Massage Therapy       Music Therapy       Hospice - will discuss about referral after FM, daughter is looking for extra help, understands patient is "declining in last year"       Nutrition KATIE LOZA          MRN-[id number]            (1929)    CC: Cough with shortness of breath    HPI:  *History provided by daughter and HHA at bedside. Patient is an 89 yo M with history of advanced dementia who presents with several days of cough and lethargy. At baseline patient has lucid moments rarely, but is interactive and walks with assistance (does not speak anymore), and for the past three or four days has been lethargic and not as interactive, and with cough for the same amount of time. Today the patient was seen to be sleeping most of the day and having more productive cough so was brought to urgent care which then sent patient to ED for infiltrates suspicious for PNA with AMS. Patient otherwise was in usual state of health, although it was noted by daughter and HHA that patient has lost a lot of weight recently, now looks much thinner than 2-4 weeks ago, and has been falling more often. Patient has not had any head injuries but has had multiple bumps and has had a wound on his right knee that has not yet healed. Otherwise ROS per HHA and daughter is negative. S/p 1gm IM ceftriaxone at urgent care center. (2018 17:13)    INTERIM EVENTS: Patient admitted to medical service on 4 Uris, placed on Bipap and noted to need enhanced observation as cannot follow commands, repeatedly tries to get up out of bed.  Family still has HHA bedside while hospitalized as they know this is a longstanding problem and patient has hx of multiple falls.  Patient seen bedside: nonverbal, unable to follow command, granddaughter states his breathing is somewhat improved, they are waiting to do a trial off of bipap.  No acute events overnight as per staff and family.  Patient alert and active within the bed.     PAST MEDICAL & SURGICAL HISTORY:  Dementia  No significant past surgical history    FAMILY HISTORY:  No pertinent family history in first degree relatives    Reviewed and     SOCIAL HISTORY: Lives in private apt with 24/7 A for last 30 years.  (wife  from Lewy body dementia) with 2 daughters: 1 is HCProxy (Jane Logan ) as per her report, no documentation available in chart  The second daughter lives in [place] and chooses to be uninvolved.  Patient was very active all his life: took extremely good care of his health, and is a retired  for [employer] retired 30 years prior) Has a grandson and granddaughter who he is very close to ( Jane's children)    ROS:    Unable to attain due to cognitive impairment nonverbal due to advanced dementia                    Dyspnea (Saloni 0-10):      2/10 while on Bipap                  N/V (Y/N):                            no indication is nauseous   Secretions (Y/N) :                  + cough  Agitation(Y/N):                      Y intermittently, and by history  Pain (Y/N):                            N  -Provocation/Palliation:  -Quality/Quantity:  -Radiating:  -Severity:  -Timing/Frequency:  -Impact on ADLs:    General:  unable due to cog impairment  HEENT:    unable due to cog impairment  Neck:  unable due to cog impairment  CVS:  unable due to cog impairment  Resp:  unable due to cog impairment  GI:  unable due to cog impairment  :  unable due to cog impairment  Musc:  unable due to cog impairment  Neuro:  unable due to cog impairment  Psych:  unable due to cog impairment  Skin:  unable due to cog impairment  Lymph:  unable due to cog impairment    Allergies  No Known Drug Allergies  Nuts (Anaphylaxis)  seeds (Anaphylaxis)    Intolerances        Opiate Naive (Y/N):  Yes is opiate naive  -iStop reviewed (Y/N): Reference #: [id number]   Medications:      MEDICATIONS  (STANDING):  ampicillin/sulbactam  IVPB 3 Gram(s) IV Intermittent every 6 hours  heparin  Injectable 5000 Unit(s) SubCutaneous every 8 hours  sodium chloride 0.9%. 1000 milliLiter(s) (125 mL/Hr) IV Continuous <Continuous>    MEDICATIONS  (PRN):  haloperidol    Injectable 0.5 milliGRAM(s) IV Push three times a day PRN agitation      Labs:    CBC:                        12.5   5.4   )-----------( 186      ( 2018 06:30 )             39.0     CMP:    -    142  |  109<H>  |  21  ----------------------------<  81  3.7   |  22  |  0.67    Ca    8.6      2018 06:30  Phos  2.1     04-12  Mg     2.1     04-12    TPro  5.6<L>  /  Alb  2.3<L>  /  TBili  0.7  /  DBili  x   /  AST  15  /  ALT  11  /  AlkPhos  71  04-12    PT/INR - ( 2018 13:36 )   PT: 14.8 sec;   INR: 1.33          PTT - ( 2018 13:36 )  PTT:29.7 sec      Imaging:  Reviewed  EXAM:  XR CHEST PORTABLE URGENT 1V                          PROCEDURE DATE:  2018      INTERPRETATION:  Portable chest    History: Tachypnea follow-up abnormal exam    Right pleural effusion/atelectasis and or infiltrates improving compared   to prior exam earlier same day. Right upper lung and left lung clear.       12 Lead ECG (18 @ 12:51)    Ventricular Rate 84 BPM    Atrial Rate 84 BPM    P-R Interval 136 ms    QRS Duration 132 ms    Q-T Interval 374 ms    QTC Calculation(Bezet) 441 ms    P Axis 61 degrees    R Axis 86 degrees    T Axis 54 degrees    Diagnosis Line Normal sinus rhythmwith sinus arrhythmia  Right bundle branch block  Nonspecific ST - T abnormalities    PEx:  T(C): 36.6 (18 @ 08:55), Max: 37.2 (18 @ 17:30)  HR: 61 (18 @ 08:55) (61 - 77)  BP: 120/80 (18 @ 08:55) (91/58 - 120/80)  RR: 22 (18 @ 08:55) (16 - 22)  SpO2: 96% (18 @ 08:55) (90% - 98%)  Wt(kg): 64kg  Daily       I&O's Summary    2018 07:  -  2018 07:00  --------------------------------------------------------  IN: 1425 mL / OUT: 0 mL / NET: 1425 mL    2018 07:  -  2018 13:59  --------------------------------------------------------  IN: 125 mL / OUT: 0 mL / NET: 125 mL      General:  alert, on guard (ready to move when allowed) in bed with bipap on  HEENT:  At/NC  Neck: no thryomegaly  CVS: s1s2  Resp: + Bipap, + rhonchi + cough, bilateral air entry  GI: + soft, + BS  : condom catheter  Musc:   strength 5/5 all 4 extermitites  Neuro: + dementia, nonverbal, no focal deficits   Psych:   agitated frequently, restless, not  Skin:   grossly intact, bruises noted from previous falls  Lymph: no gross adenopathy  Preadmit Karnofsky: 40 %           Current Karnofsky:  20 %  Cachexia (Y/N): Y  BMI: unknown, height required, but evidence of reduced SQ fat, frailty    Advanced Directives:     DNR/DNI - confirmed with daughter on phone this afternoon, documentation noted in chart confirming DNR/I from 1045am 18     [facility] - hope to complete after meeting HCP/family     DPOA - as per daughter Jane she is POA and HCP     Living Will - not known if specifically has a LWill    Decision maker:  PAtient does nto have capacity to make his own complex medical decisions as he has advanced dementia, nonverbal.  Legal surrogate:  Jane Logan, need to confirm with other daughter or with HCProxy paperwork review    GOALS OF CARE DISCUSSION       Palliative care info/counseling provided - telephone encounter in afternoon (approx 115-130)	           Family meeting - scheduled for  @1230p       Advanced Directives addressed    	          REFERRALS	        Palliative Med  - to evaluate       Unit SW/Case Mgmt              Speech/Swallow - pending       Patient/Family Support       Massage Therapy       Music Therapy       Hospice - will discuss about referral after FM, daughter is looking for extra help, understands patient is "declining in last year"       Nutrition

## 2018-04-12 NOTE — DIETITIAN INITIAL EVALUATION ADULT. - PROBLEM SELECTOR PLAN 1
2/4 SIRS criteria (bandemia, tachycardia), with pneumonia on hx and cxr. Patient with productive cough for three days, change in mental status, and bandemia to 37% and CXR with RLL consolidation suspicious for pneumonia on my read. Patient has some clearing of throat with food per daughter and HHA, possible aspiration with workup as below. DDX also includes viral (unliklely given negative RVP), advancing dementia, or underlying malignancy given weight loss. However given changing diet 2/2 dysphagia and possible aspiration, lethargy and weight loss are likely linked. Will treat for pneumonia and monitor patient overnight for convalescence. s/p Ceftriaxone IM 1gm at urgent care, then ceftriaxone 1gm and azithro 500 IV in ED  - Unasyn 1.5 gm IV q6hrs  - Aspiration precautions

## 2018-04-12 NOTE — CONSULT NOTE ADULT - PROBLEM SELECTOR RECOMMENDATION 2
requires enhanced supervision and 24/7 HHA (has private pay in pvt apt). Daughter expresses she needs more support for him when goes home, arranged for FM 4/13 with inclusion of SW: to discuss support when discharged, introduce hospice concept, and further discuss prognosis and possible interventions for quality fo life and comfort. with Parkinsonian aspects worse since addition of Haldol to regimen.  Daughter reports that agitation was severe until he was strted on present regimen.  Presently requires enhanced supervision and 24/7 HHA (has private pay in pvt apt). Daughter expresses she needs more support for him when goes home, arranged for  4/13 with inclusion of SW: to discuss support when discharged, introduce hospice concept, and further discuss prognosis and possible interventions for quality fo life and comfort.    Suggest single dose of IV Tylenol to see if pain is part of agitation

## 2018-04-12 NOTE — DIETITIAN INITIAL EVALUATION ADULT. - PROBLEM SELECTOR PLAN 4
Patient with history of advanced dementia, baseline interactive, stands and walks with assistance, has been lethargic lately. AOx0 per daughter and HHA  - home meds of Trazadone and haldol being held for current lethargy  - check EKG for qtc prolongation  - Fall precautions  - Low threshold for enhanced obs if patient is attempting to get up  - PT eval  - Palliative consult for advanced dementia  - vitamin D level in AM, replete with high dose if low otherwise will start PO vitamin D if pass s/s eval

## 2018-04-12 NOTE — PROGRESS NOTE ADULT - ASSESSMENT
Patient is an 87 yo M with history of advanced dementia who presents with cough, admitted with concerns of pneumonia vs aspiration pneumonia found to be tachypneic to the 40's this morning and was started on bipap.

## 2018-04-12 NOTE — DIETITIAN INITIAL EVALUATION ADULT. - NS AS NUTRI INTERV MEALS SNACK
please advance diet to regular if feasible per SLP recs; if po diet not feasible consider alternative ways of nutrition if in line with GOC

## 2018-04-12 NOTE — DIETITIAN INITIAL EVALUATION ADULT. - PROBLEM SELECTOR PLAN 2
I have read the EKG as follows: Sinus rhythm with widened qrs likely bifascicular block, t wave in v1 only noted, no signs of prior ischemia such as t waves or st depressions, no signs of current ischemia.

## 2018-04-12 NOTE — DIETITIAN INITIAL EVALUATION ADULT. - OTHER INFO
87 y/o M with history of advanced dementia who presents with cough, admitted with concerns of pneumonia vs aspiration pneumonia found to be tachypneic to the 40's this morning and was started on bipap. 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

## 2018-04-12 NOTE — PROGRESS NOTE ADULT - PROBLEM SELECTOR PLAN 5
Patient with history of advanced dementia, baseline interactive, stands and walks with assistance, has been lethargic lately. AOx0 per daughter and HHA, but patient stating his name is Musa, JOSE&Ox1 on exam  - home meds of Trazadone and haldol being held for current lethargy  - check EKG for qtc prolongation  - Fall precautions  - Low threshold for enhanced obs if patient is attempting to get up  - PT eval  - Palliative consult for advanced dementia  - vitamin D level in AM, replete with high dose if low otherwise will start PO vitamin D if pass s/s eval Patient with history of advanced dementia, baseline interactive, stands and walks with assistance, has been lethargic lately. AOx0 per daughter and HHA, but patient stating his name is Musa, JOSE&Ox1 on exam  - home meds of Trazadone and haldol  -start haldol 1mg IV q8 hours  -cannot have trazadone given no PO  - check EKG for qtc prolongation  - Fall precautions  - Low threshold for enhanced obs if patient is attempting to get up  - PT eval tomorrow when resp status better  - Palliative consult for advanced dementia  - vitamin D level pending, replete with high dose if low otherwise will start PO vitamin D if pass s/s eval

## 2018-04-12 NOTE — PROGRESS NOTE ADULT - PROBLEM SELECTOR PLAN 4
As above I have read the EKG as follows: Sinus rhythm with widened qrs likely bifascicular block, t wave in v1 only noted, no signs of prior ischemia such as t waves or st depressions, no signs of current ischemia. Intraventricular conduction delay. no signs of current ischemia.

## 2018-04-13 DIAGNOSIS — R62.51 FAILURE TO THRIVE (CHILD): ICD-10-CM

## 2018-04-13 DIAGNOSIS — Z51.5 ENCOUNTER FOR PALLIATIVE CARE: ICD-10-CM

## 2018-04-13 DIAGNOSIS — J69.0 PNEUMONITIS DUE TO INHALATION OF FOOD AND VOMIT: ICD-10-CM

## 2018-04-13 DIAGNOSIS — G20 PARKINSON'S DISEASE: ICD-10-CM

## 2018-04-13 LAB
ANION GAP SERPL CALC-SCNC: 14 MMOL/L — SIGNIFICANT CHANGE UP (ref 5–17)
BUN SERPL-MCNC: 15 MG/DL — SIGNIFICANT CHANGE UP (ref 7–23)
CALCIUM SERPL-MCNC: 8.5 MG/DL — SIGNIFICANT CHANGE UP (ref 8.4–10.5)
CHLORIDE SERPL-SCNC: 111 MMOL/L — HIGH (ref 96–108)
CO2 SERPL-SCNC: 19 MMOL/L — LOW (ref 22–31)
CREAT SERPL-MCNC: 0.54 MG/DL — SIGNIFICANT CHANGE UP (ref 0.5–1.3)
GLUCOSE SERPL-MCNC: 96 MG/DL — SIGNIFICANT CHANGE UP (ref 70–99)
HCT VFR BLD CALC: 39.2 % — SIGNIFICANT CHANGE UP (ref 39–50)
HGB BLD-MCNC: 12.8 G/DL — LOW (ref 13–17)
MAGNESIUM SERPL-MCNC: 2 MG/DL — SIGNIFICANT CHANGE UP (ref 1.6–2.6)
MCHC RBC-ENTMCNC: 30 PG — SIGNIFICANT CHANGE UP (ref 27–34)
MCHC RBC-ENTMCNC: 32.7 G/DL — SIGNIFICANT CHANGE UP (ref 32–36)
MCV RBC AUTO: 91.8 FL — SIGNIFICANT CHANGE UP (ref 80–100)
PHOSPHATE SERPL-MCNC: 2.7 MG/DL — SIGNIFICANT CHANGE UP (ref 2.5–4.5)
PLATELET # BLD AUTO: 196 K/UL — SIGNIFICANT CHANGE UP (ref 150–400)
POTASSIUM SERPL-MCNC: 3.6 MMOL/L — SIGNIFICANT CHANGE UP (ref 3.5–5.3)
POTASSIUM SERPL-SCNC: 3.6 MMOL/L — SIGNIFICANT CHANGE UP (ref 3.5–5.3)
RBC # BLD: 4.27 M/UL — SIGNIFICANT CHANGE UP (ref 4.2–5.8)
RBC # FLD: 15.1 % — SIGNIFICANT CHANGE UP (ref 10.3–16.9)
SODIUM SERPL-SCNC: 144 MMOL/L — SIGNIFICANT CHANGE UP (ref 135–145)
WBC # BLD: 5.8 K/UL — SIGNIFICANT CHANGE UP (ref 3.8–10.5)
WBC # FLD AUTO: 5.8 K/UL — SIGNIFICANT CHANGE UP (ref 3.8–10.5)

## 2018-04-13 PROCEDURE — 99233 SBSQ HOSP IP/OBS HIGH 50: CPT | Mod: GC

## 2018-04-13 PROCEDURE — 99497 ADVNCD CARE PLAN 30 MIN: CPT | Mod: 25

## 2018-04-13 PROCEDURE — 99223 1ST HOSP IP/OBS HIGH 75: CPT

## 2018-04-13 RX ORDER — HALOPERIDOL DECANOATE 100 MG/ML
0.5 INJECTION INTRAMUSCULAR EVERY 8 HOURS
Qty: 0 | Refills: 0 | Status: DISCONTINUED | OUTPATIENT
Start: 2018-04-13 | End: 2018-04-16

## 2018-04-13 RX ORDER — HALOPERIDOL DECANOATE 100 MG/ML
1 INJECTION INTRAMUSCULAR ONCE
Qty: 0 | Refills: 0 | Status: COMPLETED | OUTPATIENT
Start: 2018-04-13 | End: 2018-04-13

## 2018-04-13 RX ORDER — ACETAMINOPHEN 500 MG
650 TABLET ORAL EVERY 6 HOURS
Qty: 0 | Refills: 0 | Status: DISCONTINUED | OUTPATIENT
Start: 2018-04-13 | End: 2018-04-16

## 2018-04-13 RX ORDER — POTASSIUM CHLORIDE 20 MEQ
40 PACKET (EA) ORAL ONCE
Qty: 0 | Refills: 0 | Status: DISCONTINUED | OUTPATIENT
Start: 2018-04-13 | End: 2018-04-13

## 2018-04-13 RX ORDER — ACETAMINOPHEN 500 MG
2 TABLET ORAL
Qty: 40 | Refills: 0 | OUTPATIENT
Start: 2018-04-13 | End: 2018-04-17

## 2018-04-13 RX ORDER — HALOPERIDOL DECANOATE 100 MG/ML
0.5 INJECTION INTRAMUSCULAR EVERY 8 HOURS
Qty: 0 | Refills: 0 | Status: DISCONTINUED | OUTPATIENT
Start: 2018-04-13 | End: 2018-04-13

## 2018-04-13 RX ORDER — QUETIAPINE FUMARATE 200 MG/1
25 TABLET, FILM COATED ORAL AT BEDTIME
Qty: 0 | Refills: 0 | Status: DISCONTINUED | OUTPATIENT
Start: 2018-04-13 | End: 2018-04-16

## 2018-04-13 RX ORDER — POTASSIUM CHLORIDE 20 MEQ
10 PACKET (EA) ORAL
Qty: 0 | Refills: 0 | Status: COMPLETED | OUTPATIENT
Start: 2018-04-13 | End: 2018-04-13

## 2018-04-13 RX ORDER — ACETAMINOPHEN 500 MG
2 TABLET ORAL
Qty: 0 | Refills: 0 | COMMUNITY
Start: 2018-04-13

## 2018-04-13 RX ADMIN — QUETIAPINE FUMARATE 25 MILLIGRAM(S): 200 TABLET, FILM COATED ORAL at 22:52

## 2018-04-13 RX ADMIN — HALOPERIDOL DECANOATE 1 MILLIGRAM(S): 100 INJECTION INTRAMUSCULAR at 06:01

## 2018-04-13 RX ADMIN — AMPICILLIN SODIUM AND SULBACTAM SODIUM 200 GRAM(S): 250; 125 INJECTION, POWDER, FOR SUSPENSION INTRAMUSCULAR; INTRAVENOUS at 18:36

## 2018-04-13 RX ADMIN — HALOPERIDOL DECANOATE 0.5 MILLIGRAM(S): 100 INJECTION INTRAMUSCULAR at 13:30

## 2018-04-13 RX ADMIN — Medication 100 MILLIEQUIVALENT(S): at 11:21

## 2018-04-13 RX ADMIN — HALOPERIDOL DECANOATE 0.5 MILLIGRAM(S): 100 INJECTION INTRAMUSCULAR at 22:52

## 2018-04-13 RX ADMIN — HEPARIN SODIUM 5000 UNIT(S): 5000 INJECTION INTRAVENOUS; SUBCUTANEOUS at 06:01

## 2018-04-13 RX ADMIN — AMPICILLIN SODIUM AND SULBACTAM SODIUM 200 GRAM(S): 250; 125 INJECTION, POWDER, FOR SUSPENSION INTRAMUSCULAR; INTRAVENOUS at 01:46

## 2018-04-13 RX ADMIN — AMPICILLIN SODIUM AND SULBACTAM SODIUM 200 GRAM(S): 250; 125 INJECTION, POWDER, FOR SUSPENSION INTRAMUSCULAR; INTRAVENOUS at 13:30

## 2018-04-13 RX ADMIN — HEPARIN SODIUM 5000 UNIT(S): 5000 INJECTION INTRAVENOUS; SUBCUTANEOUS at 13:30

## 2018-04-13 RX ADMIN — Medication 100 MILLIEQUIVALENT(S): at 12:19

## 2018-04-13 RX ADMIN — Medication 100 MILLIEQUIVALENT(S): at 10:19

## 2018-04-13 RX ADMIN — HALOPERIDOL DECANOATE 1 MILLIGRAM(S): 100 INJECTION INTRAMUSCULAR at 01:46

## 2018-04-13 RX ADMIN — SODIUM CHLORIDE 80 MILLILITER(S): 9 INJECTION, SOLUTION INTRAVENOUS at 22:52

## 2018-04-13 RX ADMIN — AMPICILLIN SODIUM AND SULBACTAM SODIUM 200 GRAM(S): 250; 125 INJECTION, POWDER, FOR SUSPENSION INTRAMUSCULAR; INTRAVENOUS at 07:40

## 2018-04-13 RX ADMIN — HEPARIN SODIUM 5000 UNIT(S): 5000 INJECTION INTRAVENOUS; SUBCUTANEOUS at 22:52

## 2018-04-13 NOTE — PHYSICAL THERAPY INITIAL EVALUATION ADULT - GAIT DEVIATIONS NOTED, PT EVAL
decreased cale/decreased step length/slightly unsteady gait, no lose of balance, shuffling gait, decreased heel strike and hip flexion bilaterally.

## 2018-04-13 NOTE — BEHAVIORAL HEALTH ASSESSMENT NOTE - NSBHREFERDETAILS_PSY_A_CORE_FT
88-year-old male with hx of Alzheimer's Dementia with Behavioral Disturbance, on Haldol. Has cogwheeling and ?akathisia.

## 2018-04-13 NOTE — PROGRESS NOTE ADULT - PROBLEM SELECTOR PLAN 6
Fall precautions, PT as above.  - f/u CT Head  - f/u B12, rpr, TSH, t4 Fall precautions, PT as above.  - B12, TSH, t4 wnl  -f/u RPR. Patient has weight loss and inability to eat in the recent past  -aspiration precautions

## 2018-04-13 NOTE — PROGRESS NOTE ADULT - PROBLEM SELECTOR PLAN 1
2/4 SIRS criteria (bandemia, tachycardia), with pneumonia on hx and cxr. Patient with productive cough for three days, change in mental status, and bandemia to 37% and CXR with RLL consolidation suspicious for pneumonia on my read. Patient has some clearing of throat with food per daughter and HHA, possible aspiration with workup as below. DDX also includes viral (unliklely given negative RVP), advancing dementia, or underlying malignancy given weight loss. However given changing diet 2/2 dysphagia and possible aspiration, lethargy and weight loss are likely linked. Will treat for pneumonia and monitor patient overnight for convalescence. s/p Ceftriaxone IM 1gm at urgent care, then ceftriaxone 1gm and azithro 500 IV in ED  - Unasyn 1.5 gm IV q6hrs  - Aspiration precautions 2/4 SIRS criteria (bandemia, tachycardia), with pneumonia on hx and cxr. Patient with productive cough for three days, change in mental status, and bandemia to 37% and CXR with RLL consolidation suspicious for pneumonia on my read. Patient has some clearing of throat with food per daughter and HHA. lethargy and weight loss are likely linked to aspiration.  - Unasyn 3 gm IV q6hrs  - Aspiration precautions  -RVP negative  -S&S said the patient was likely aspirating on all consistencies, would recommend NPO vs pureed thin liquids with known aspiration, but if the patient should go to home hospice and the family could decide if they would like to comfort feed

## 2018-04-13 NOTE — DISCHARGE NOTE ADULT - CARE PLAN
Principal Discharge DX:	Aspiration pneumonia of right lower lobe due to regurgitated food  Goal:	complete antibiotics  Assessment and plan of treatment:	You came in with symptoms of infection and were found to have a right lower lobe pneumonia likely from aspiration of food. Speech and swallow saw you in the hospital and recommends NPO vs pureed thin liquids with known aspiration. the HCP decided to initiate family supervised comfort feeds with known aspiration risk. You were started on unasyn for your infection and your symptoms improved.  Secondary Diagnosis:	Aspiration of food  Assessment and plan of treatment:	You are aspirating while eating. Speech and swallow recommended NPO vs pureed thin liquids with known aspiration. the HCP decided to initiate family supervised comfort feeds with known aspiration risk.  Secondary Diagnosis:	Sepsis  Assessment and plan of treatment:	You were found to have  Secondary Diagnosis:	Dementia  Assessment and plan of treatment:	You should continue to take haldol 0.5mg PO TID. You should continue to take seroquel 25mg at bedtime.  Secondary Diagnosis:	Do not resuscitate discussion  Assessment and plan of treatment:	The patient is DNR/DNI and his health care proxy decided to initiate comfort feeds with known risks.  Secondary Diagnosis:	Failure to thrive (0-17) Principal Discharge DX:	Aspiration pneumonia of right lower lobe due to regurgitated food  Goal:	complete antibiotics  Assessment and plan of treatment:	You came in with symptoms of infection and were found to have a right lower lobe pneumonia likely from aspiration of food. Speech and swallow saw you in the hospital and recommends NPO vs pureed thin liquids with known aspiration. the HCP decided to initiate family supervised comfort feeds with known aspiration risk. You were started on unasyn for your infection and your symptoms improved.  Secondary Diagnosis:	Aspiration of food  Assessment and plan of treatment:	You are aspirating while eating. Speech and swallow recommended NPO vs pureed thin liquids with known aspiration. the HCP decided to initiate family supervised comfort feeds with known aspiration risk.  Secondary Diagnosis:	Sepsis  Assessment and plan of treatment:	You were found to have  Secondary Diagnosis:	Dementia  Assessment and plan of treatment:	Your mental status has improved after lowering the dose of your haldol and treating your infection. You should continue to take haldol 0.5mg PO TID. You should continue to take seroquel 25mg at bedtime.  Secondary Diagnosis:	Do not resuscitate discussion  Assessment and plan of treatment:	The patient is DNR/DNI and his health care proxy decided to initiate comfort feeds with known risks.  Secondary Diagnosis:	Failure to thrive (0-17) Principal Discharge DX:	Aspiration pneumonia of right lower lobe due to regurgitated food  Goal:	complete antibiotics  Assessment and plan of treatment:	You came in with symptoms of infection and were found to have a right lower lobe pneumonia likely from aspiration of food. Speech and swallow saw you in the hospital and recommends NPO vs pureed thin liquids with known aspiration. the HCP decided to initiate family supervised comfort feeds with known aspiration risk. You were started on unasyn for your infection and your symptoms improved.  Secondary Diagnosis:	Aspiration of food  Assessment and plan of treatment:	You are aspirating while eating. Speech and swallow recommended NPO vs pureed thin liquids with known aspiration. the HCP decided to initiate family supervised comfort feeds with known aspiration risk.  Secondary Diagnosis:	Sepsis  Assessment and plan of treatment:	You were found to have an infection in your lungs due to aspiration. Your infection improved with antibiotics. You should continue antibiotics until complete.  Secondary Diagnosis:	Dementia  Assessment and plan of treatment:	Your mental status has improved after lowering the dose of your haldol and treating your infection. You should continue to take haldol 0.5mg PO TID. You should continue to take seroquel 25mg at bedtime.  Secondary Diagnosis:	Do not resuscitate discussion  Assessment and plan of treatment:	The patient is DNR/DNI and his health care proxy decided to initiate comfort feeds with known risks.  Secondary Diagnosis:	Failure to thrive (0-17)  Assessment and plan of treatment:	You have lost a lot of weight due to decreased PO intake. You will go home with home hospice.

## 2018-04-13 NOTE — PHYSICAL THERAPY INITIAL EVALUATION ADULT - GENERAL OBSERVATIONS, REHAB EVAL
Pt received supine in bed with +IV, +[place], +NC~4L, NAD. aide present, Pt left OOB sitting in the chair, call bell in reach, + bed alarm, NAD, line intact, aide present, RN awares. Pt has no complaint through out session.

## 2018-04-13 NOTE — PROGRESS NOTE ADULT - PROBLEM SELECTOR PLAN 3
Has strong features of parkinsons' dementia based on clinical features: shuffling gait, frequent falls, length of disease. Presently requires enhanced supervision and 24/7 HHA (has private pay aide in pvt apt).  Geripsych consult called to make recommendations for agitation at night, falls.  did have dose of seroquel and tylenol providing good rest from 9p-1a an reduced agitation.  recommend seroquel 25mg QHS and Tylenol before bed (oral-liquid) Has strong features of parkinsons' dementia based on clinical features: shuffling gait, frequent falls, length of disease. Presently requires enhanced supervision and 24/7 HHA (has private pay aide in pvt apt). See note above  did have dose of seroquel and tylenol providing good rest from 9p-1a an reduced agitation.  recommend seroquel 25mg QHS and Tylenol before bed (oral-liquid)

## 2018-04-13 NOTE — DISCHARGE NOTE ADULT - PATIENT PORTAL LINK FT
You can access the [facility] Patient Portal, offered by [facility], by registering with the following website: [email or web address]

## 2018-04-13 NOTE — PROGRESS NOTE ADULT - PROBLEM SELECTOR PLAN 2
c/w supervision at all times and redirection.  PT has evaluated: d/w daughter, will go home with home PT visits.  nigel-psych consult to reassess psych med regimen to minimize iatrogenic influence of imbalance. c/w supervision at all times and redirection.  PT has evaluated: d/w daughter, will go home with home PT visits.  nigel-psych consult with Dr. Ramirez reassess psych med regimen to minimize iatrogenic influence of imbalance. Suggested stopping trazadone, adding Seroquel and decreasing Haldol None

## 2018-04-13 NOTE — PROGRESS NOTE ADULT - SUBJECTIVE AND OBJECTIVE BOX
***INCOMPLETE  INTERVAL HPI/OVERNIGHT EVENTS:    VITAL SIGNS:  T(F): 97.7 (18 @ 08:57)  HR: 60 (18 @ 08:57)  BP: 161/70 (18 @ 08:57)  RR: 22 (18 @ 08:57)  SpO2: 100% (18 @ 08:57)  Wt(kg): --    PHYSICAL EXAM:    Constitutional: Well developed, well nourished  General: laying comfortably  Eyes: PERRL  ENMT: moist mucous membranes, no mucosal pallor, clear throat, uvula midline  Neck: supple  Respiratory: CTABL, no rales, no crackles, no wheezing  Cardiovascular: +S1, +S2, no murmurs, rubs or gallops, regular rate and rhythm  Gastrointestinal: abdomen soft, non distended, non tender, +BS  Extremities: no edema, no calf pain to palpation  Vascular: cap refill <3s in all extremities, radial and DP pulses 2+  Neurological: AAO x3  Skin: no rashes    MEDICATIONS  (STANDING):  ampicillin/sulbactam  IVPB 3 Gram(s) IV Intermittent every 6 hours  dextrose 5% + lactated ringers. 1000 milliLiter(s) (80 mL/Hr) IV Continuous <Continuous>  haloperidol    Injectable 1 milliGRAM(s) IV Push three times a day  heparin  Injectable 5000 Unit(s) SubCutaneous every 8 hours  potassium chloride  10 mEq/100 mL IVPB 10 milliEquivalent(s) IV Intermittent every 1 hour    MEDICATIONS  (PRN):      Allergies    No Known Drug Allergies  Nuts (Anaphylaxis)  seeds (Anaphylaxis)    Intolerances        LABS:                        12.8   5.8   )-----------( 196      ( 2018 06:12 )             39.2     04-13    144  |  111<H>  |  15  ----------------------------<  96  3.6   |  19<L>  |  0.54    Ca    8.5      2018 06:12  Phos  2.7     04-13  Mg     2.0     -13    TPro  5.6<L>  /  Alb  2.3<L>  /  TBili  0.7  /  DBili  x   /  AST  15  /  ALT  11  /  AlkPhos  71  04-12    PT/INR - ( 2018 13:36 )   PT: 14.8 sec;   INR: 1.33          PTT - ( 2018 13:36 )  PTT:29.7 sec  Urinalysis Basic - ( 2018 15:07 )    Color: Yellow / Appearance: SL Cloudy / S.020 / pH: x  Gluc: x / Ketone: 15 mg/dL  / Bili: Negative / Urobili: 4.0 E.U./dL   Blood: x / Protein: Trace mg/dL / Nitrite: NEGATIVE   Leuk Esterase: NEGATIVE / RBC: x / WBC < 5 /HPF   Sq Epi: x / Non Sq Epi: 0-5 /HPF / Bacteria: Present /HPF        RADIOLOGY & ADDITIONAL TESTS: Reviewed. INTERVAL HPI/OVERNIGHT EVENTS: The patient had no acute events overnight and his respiratory status was stable. He was on non-rebreather overnight and was transitioned to NC this morning.     VITAL SIGNS:  T(F): 97.7 (18 @ 08:57)  HR: 60 (18 @ 08:57)  BP: 161/70 (18 @ 08:57)  RR: 22 (18 @ 08:57)  SpO2: 100% (18 @ 08:57)  Wt(kg): --    PHYSICAL EXAM:    Constitutional: Well developed, well nourished  General: laying comfortably  ENMT: moist mucous membranes, no mucosal pallor, clear throat, uvula midline  Neck: supple  Respiratory: CTABL, no rales, no crackles, no wheezing  Cardiovascular: +S1, +S2, no murmurs, rubs or gallops, regular rate and rhythm  Gastrointestinal: abdomen soft, non distended, non tender, +BS  Extremities: no edema, no calf pain to palpation  Vascular: cap refill <3s in all extremities, radial and DP pulses 2+  Neurological: AAO x1 to person  Skin: no rashes    MEDICATIONS  (STANDING):  ampicillin/sulbactam  IVPB 3 Gram(s) IV Intermittent every 6 hours  dextrose 5% + lactated ringers. 1000 milliLiter(s) (80 mL/Hr) IV Continuous <Continuous>  haloperidol    Injectable 1 milliGRAM(s) IV Push three times a day  heparin  Injectable 5000 Unit(s) SubCutaneous every 8 hours  potassium chloride  10 mEq/100 mL IVPB 10 milliEquivalent(s) IV Intermittent every 1 hour    MEDICATIONS  (PRN):      Allergies    No Known Drug Allergies  Nuts (Anaphylaxis)  seeds (Anaphylaxis)    Intolerances        LABS:                        12.8   5.8   )-----------( 196      ( 2018 06:12 )             39.2     04-13    144  |  111<H>  |  15  ----------------------------<  96  3.6   |  19<L>  |  0.54    Ca    8.5      2018 06:12  Phos  2.7     04-13  Mg     2.0     04-13    TPro  5.6<L>  /  Alb  2.3<L>  /  TBili  0.7  /  DBili  x   /  AST  15  /  ALT  11  /  AlkPhos  71  04-12    PT/INR - ( 2018 13:36 )   PT: 14.8 sec;   INR: 1.33          PTT - ( 2018 13:36 )  PTT:29.7 sec  Urinalysis Basic - ( 2018 15:07 )    Color: Yellow / Appearance: SL Cloudy / S.020 / pH: x  Gluc: x / Ketone: 15 mg/dL  / Bili: Negative / Urobili: 4.0 E.U./dL   Blood: x / Protein: Trace mg/dL / Nitrite: NEGATIVE   Leuk Esterase: NEGATIVE / RBC: x / WBC < 5 /HPF   Sq Epi: x / Non Sq Epi: 0-5 /HPF / Bacteria: Present /HPF        RADIOLOGY & ADDITIONAL TESTS: Reviewed.

## 2018-04-13 NOTE — DISCHARGE NOTE ADULT - MEDICATION SUMMARY - MEDICATIONS TO CHANGE
I will SWITCH the dose or number of times a day I take the medications listed below when I get home from the hospital:    haloperidol    haloperidol 1 mg oral tablet  -- 1 tab(s) by mouth 3 times a day

## 2018-04-13 NOTE — BEHAVIORAL HEALTH ASSESSMENT NOTE - OTHER
Home Health Aide A 24/7 Declining physical and cognitive functioning UTO due to pt's cognitive deficits and lethargy As stated above, pt with passive suicidality. Also with frequent episodes of paranoid ideation/delusions, i.e. accusing HHA of poisoning his food. Pt currently seated. Per daughter, + shuffling gait (due to Haldol?) OK Stable Impoverished

## 2018-04-13 NOTE — PROGRESS NOTE ADULT - PROBLEM SELECTOR PLAN 9
Heparin SQ 5000units Q8hrs  No indication for stress ulcer ppx at this time  FULL CODE - confirmed with HCP daughter Jane Logan [phone]  Dispo: RMF family considering home hospice Heparin SQ 5000units Q8hrs  No indication for stress ulcer ppx at this time  DNR/DNI - confirmed with HCP daughter Jane Logan [phone]  Dispo: RMF family considering home hospice

## 2018-04-13 NOTE — BEHAVIORAL HEALTH ASSESSMENT NOTE - SUMMARY
89 yo M with history of advanced Alzheimer's dementia who presents with several days of cough and lethargy. At baseline patient has lucid moments rarely, but is interactive and walks with assistance. Pt rx'ed with Haldol for behavioral disturbance which appears to have EPS SE including cogwheel rigidity and shuffling gait, as well as possible akathisia.   Pt's sleep is dysregulated c/w dementia, even with rx with Trazodone.  For now, recommend cross taper of seroquel and haldol, reducing Haldol to 0.5 mg TID and starting standing seroquel 25 mg po qhs. Since seroquel should also help with sleep, recommend d/c of trazodone.  Daughter counselled re black box warning of increased morbidity and mortality of all antipsychotics in pts with dementia. She accepts risks given potential benefits.

## 2018-04-13 NOTE — BEHAVIORAL HEALTH ASSESSMENT NOTE - NSBHADMITCOUNSEL_PSY_A_CORE
diagnostic results/impressions and/or recommended studies/instructions for management, treatment and follow up/risk factor reduction/prognosis/risks and benefits of treatment options/client/family/caregiver education

## 2018-04-13 NOTE — PROGRESS NOTE ADULT - ASSESSMENT
89yo M with Advanced Dementia ( nonverbal, is ambulatory with assist), history of frequent falls, admitted for Aspriation pneumonia.  Palliative Care caleld to consult for goals of care 89yo M with Advanced Dementia ( nonverbal, is ambulatory with assist), history of frequent falls, admitted for Aspriation pneumonia.  Palliative Care called to consult for goals of care 87yo M with Advanced Dementia ( nonverbal, is ambulatory with assist), history of frequent falls, admitted for Aspriation pneumonia.  Palliative Care called to consult for symptom management and goals of care

## 2018-04-13 NOTE — PROGRESS NOTE ADULT - PROBLEM SELECTOR PROBLEM 2
Alzheimer's dementia with behavioral disturbance, unspecified timing of dementia onset Falls frequently

## 2018-04-13 NOTE — DISCHARGE NOTE ADULT - HOSPITAL COURSE
Patient is an 87 yo M with history of advanced dementia who presents with cough and tachypnea likely 2/2 aspiration pneumonia.    Patient admitted for cough, weakness, and poor intake 2/2 RLL aspirational pneumonia. chest xray showed RLL PNA. IN ED EKG shosed On 4/12: patient tachypneic ABG drawn and lost. placedon non rebreather and bipap and resp status improved. ABG showed CO2 28 and O2 >100. lactate 2.6 which resolved to 1.2 with IVF. increased Unasyn dose to 3g Q6. seen by palliative, Dr. Gomez, and daughter (Jane) wanted DNR/DNI. patient taken off BIPAP as has resp alkalosis on repeat ABG, placed back on nonrebreather. pateint had intermittant tachypenea episodes, possibly related pain or agitation. Ordered haldol 1mg IV TID and seroqual 25mg x1 and tylenol 1g IV x1. UA showed ketones (15), 2/2 starvation ketosis, ordered D5W with LR. On 4/13 d/c nonrebreather and placed on 4L NC. patient noted to have cogwheeled rigidity, palliative requested yanna-psych for evaluate if haldol is needed, prior to home hospice. S&S recommends NPO vs pureed thin liquids with known aspiration Patient is an 87 yo M with history of advanced dementia who presents with cough and tachypnea likely 2/2 aspiration pneumonia. Patient admitted for cough, weakness, and poor intake 2/2 RLL aspirational pneumonia. chest xray showed RLL PNA. IN ED EKG shosed On 4/12: patient tachypneic ABG drawn and lost. placedon non rebreather and bipap and resp status improved. ABG showed CO2 28 and O2 >100. lactate 2.6 which resolved to 1.2 with IVF. increased Unasyn dose to 3g Q6. seen by palliative, Dr. Gomez, and daughter (Jane) wanted DNR/DNI. patient taken off BIPAP as has resp alkalosis on repeat ABG, placed back on nonrebreather. pateint had intermittant tachypenea episodes, possibly related pain or agitation. Ordered haldol 1mg IV TID and seroqual 25mg x1 and tylenol 1g IV x1. UA showed ketones (15), 2/2 starvation ketosis, ordered D5W with LR. On 4/13 d/c nonrebreather and placed on 4L NC. patient noted to have cogwheeled rigidity, palliative requested yanna-psych for evaluate if haldol is needed, prior to home hospice. S&S recommends NPO vs pureed thin liquids with known aspiration Patient is an 87 yo M with history of advanced dementia who presents with cough and tachypnea likely 2/2 aspiration pneumonia. Patient admitted for cough, weakness, and poor intake 2/2 RLL aspirational pneumonia. CXR showed RLL PNA. The patient had episodes of tachypnea and an associated respiratory alkalosis, these episodes were though to be 2/2 to pain or agitation and improved with medication titration (tylenol and haldol/seroquel). Bipap was given for a short period of time, but this was discontinued as it made the respiratory alkalosis worse and agitated the patient. The patient was placed on nonrebreather and tachypnea resolved. On 4/13 d/c nonrebreather and placed on NC.  On the F, 4/12, Unasyn dose was increased to 3g Q6 for his pneumonia. Palliative care was consulted and the patient was made DNR/DNI with supervised comfort feeds and plan for home hospice. UA showed ketones (15), 2/2 starvation ketosis, ordered D5W with LR. The patient noted to have cogwheeled rigidity, palliative requested geriatric -psych for evaluate if haldol dose prior to home hospice. Per geriatric psych, haldol was decreased to 0.5mg PO TID and added seroquel 25mg QHS. the patient's mental status improved and he was comfortable. S&S evaluated the patient and recommended NPO vs pureed thin liquids with known aspiration. The patient's HCP (daughter Jane) decided to allow family supervised comfort feeds with known aspiration risk.   The patient is ready for transfer to home hospice. Patient is an 89 yo M with history of advanced dementia who presents with cough and tachypnea likely 2/2 aspiration pneumonia. Patient admitted for cough, weakness, and poor intake 2/2 RLL aspirational pneumonia. CXR showed RLL PNA. The patient had episodes of tachypnea and an associated respiratory alkalosis, these episodes were though to be 2/2 to pain or agitation and improved with medication titration (tylenol and haldol/seroquel). Bipap was given for a short period of time, but this was discontinued as it made the respiratory alkalosis worse and agitated the patient. The patient was placed on nonrebreather and tachypnea resolved. On 4/13 d/c nonrebreather and placed on NC.  On the F, 4/12, Unasyn dose was increased to 3g Q6 for his pneumonia. The patient will be discharged on augmentin liquid with stop date 4/18/18. Palliative care was consulted and the patient was made DNR/DNI with supervised comfort feeds and plan for home hospice. UA showed ketones (15), 2/2 starvation ketosis, ordered D5W with LR. The patient noted to have cogwheeled rigidity, palliative requested geriatric -psych for evaluate if haldol dose prior to home hospice. Per geriatric psych, haldol was decreased to 0.5mg PO TID and added seroquel 25mg QHS. the patient's mental status improved and he was comfortable. S&S evaluated the patient and recommended NPO vs pureed thin liquids with known aspiration. The patient's HCP (daughter Jane) decided to allow family supervised comfort feeds with known aspiration risk.   The patient is ready for transfer to home hospice.

## 2018-04-13 NOTE — PHYSICAL THERAPY INITIAL EVALUATION ADULT - IMPAIRMENTS FOUND, PT EVAL
gross motor/gait, locomotion, and balance/muscle strength/poor safety awareness/aerobic capacity/endurance

## 2018-04-13 NOTE — PROGRESS NOTE ADULT - PROBLEM SELECTOR PLAN 2
I have read the EKG as follows: Sinus rhythm with widened qrs likely bifascicular block, t wave in v1 only noted, no signs of prior ischemia such as t waves or st depressions, no signs of current ischemia. As above

## 2018-04-13 NOTE — BEHAVIORAL HEALTH ASSESSMENT NOTE - DETAILS
For the last 2 months, pt has been telling his HHA, "I just want to die." As above. Pt is frequently combative and aggressive, especially during ADL care.

## 2018-04-13 NOTE — SWALLOW BEDSIDE ASSESSMENT ADULT - ORAL PHASE
Delayed, effortful munch chew of solids. + bolus hold w/ solids only. Solids cleared from oral cavity when thin liquid presented. no oral residue or holding of puree.

## 2018-04-13 NOTE — SWALLOW BEDSIDE ASSESSMENT ADULT - PHARYNGEAL PHASE
Pharyngeal swallow response suspected to be delayed. + hyolaryngeal elevation is observed upon palpation across PO. Pt desats to <90% during intake. + wet vocal quality following thin wash of solids.

## 2018-04-13 NOTE — DISCHARGE NOTE ADULT - INSTRUCTIONS
Family Supervised comfort feeds  Speech and swallow recommended NPO vs pureed thin liquids with known aspiration

## 2018-04-13 NOTE — CONSULT NOTE ADULT - SUBJECTIVE AND OBJECTIVE BOX
Patient is a 88y old  Male who presents with a chief complaint of Sent by urgent care for PNA, has had coughing and lethargy for days (2018 17:13)       HPI:  *History provided by daughter and HHA at bedside. Patient is an 87 yo M with history of advanced dementia who presents with several days of cough and lethargy. At baseline patient has lucid moments rarely, but is interactive and walks with assistance (does not speak anymore), and for the past three or four days has been lethargic and not as interactive, and with cough for the same amount of time. Today the patient was seen to be sleeping most of the day and having more productive cough so was brought to urgent care which then sent patient to ED for infiltrates suspicious for PNA with AMS. Patient otherwise was in usual state of health, although it was noted by daughter and HHA that patient has lost a lot of weight recently, now looks much thinner than 2-4 weeks ago, and has been falling more often. Patient has not had any head injuries but has had multiple bumps and has had a wound on his right knee that has not yet healed. Otherwise ROS per HHA and daughter is negative. S/p 1gm IM ceftriaxone at urgent care center.     ED vitals: T(F): 99.8 HR: 77 BP: 103/65  RR: 14SpO2: 94%   ED administration: Ceftriaxone 1gm IV, Azithromycin 500mg IV. 2L NS bolus (2018 17:13)      PAST MEDICAL & SURGICAL HISTORY:  Dementia  No significant past surgical history      MEDICATIONS  (STANDING):  ampicillin/sulbactam  IVPB 3 Gram(s) IV Intermittent every 6 hours  dextrose 5% + lactated ringers. 1000 milliLiter(s) (80 mL/Hr) IV Continuous <Continuous>  haloperidol    Injectable 1 milliGRAM(s) IV Push three times a day  heparin  Injectable 5000 Unit(s) SubCutaneous every 8 hours  potassium chloride   Powder 40 milliEquivalent(s) Oral once    MEDICATIONS  (PRN):      Social History: , has 2 daughters ( 1 lives in [place]- not involved in his care), lives [place] in an elevator accessible apartment building, has 24 hr x 7 day home care services    Functional Level Prior to Admission: ADL's with assist, walked at home with a walker and 1 person assist, has hospital bed/ Dwayne lift/ wheelchair/walker    FAMILY HISTORY:  No pertinent family history in first degree relatives      CBC Full  -  ( 2018 06:12 )  WBC Count : 5.8 K/uL  Hemoglobin : 12.8 g/dL  Hematocrit : 39.2 %  Platelet Count - Automated : 196 K/uL  Mean Cell Volume : 91.8 fL  Mean Cell Hemoglobin : 30.0 pg  Mean Cell Hemoglobin Concentration : 32.7 g/dL  Auto Neutrophil # : x  Auto Lymphocyte # : x  Auto Monocyte # : x  Auto Eosinophil # : x  Auto Basophil # : x  Auto Neutrophil % : x  Auto Lymphocyte % : x  Auto Monocyte % : x  Auto Eosinophil % : x  Auto Basophil % : x      04-    144  |  111<H>  |  15  ----------------------------<  96  3.6   |  19<L>  |  0.54    Ca    8.5      2018 06:12  Phos  2.7     -  Mg     2.0     -    TPro  5.6<L>  /  Alb  2.3<L>  /  TBili  0.7  /  DBili  x   /  AST  15  /  ALT  11  /  AlkPhos  71  04-12      Urinalysis Basic - ( 2018 15:07 )    Color: Yellow / Appearance: SL Cloudy / S.020 / pH: x  Gluc: x / Ketone: 15 mg/dL  / Bili: Negative / Urobili: 4.0 E.U./dL   Blood: x / Protein: Trace mg/dL / Nitrite: NEGATIVE   Leuk Esterase: NEGATIVE / RBC: x / WBC < 5 /HPF   Sq Epi: x / Non Sq Epi: 0-5 /HPF / Bacteria: Present /HPF          Radiology:    < from: Xray Chest 1 View- PORTABLE-Urgent (18 @ 09:14) >  EXAM:  XR CHEST PORTABLE URGENT 1V                          PROCEDURE DATE:  2018                     INTERPRETATION:  Portable chest    History: Tachypnea follow-up abnormal exam    Right pleural effusion/atelectasis and or infiltrates improving compared   to prior exam earlier same day. Right upper lung and left lung clear.            Vital Signs Last 24 Hrs  T(C): 36.5 (2018 08:57), Max: 36.7 (2018 04:40)  T(F): 97.7 (2018 08:57), Max: 98 (2018 04:40)  HR: 60 (2018 08:57) (60 - 81)  BP: 161/70 (2018 08:57) (128/65 - 161/70)  BP(mean): --  RR: 22 (2018 08:57) (20 - 22)  SpO2: 100% (2018 08:57) (99% - 100%)    REVIEW OF SYSTEMS: patient is non verbal      Physical Exam: frail/elderly/cachectic  gentleman lying in semi Fowlers position, daughter present at bedside    Head: normocephalic, atraumatic    Eyes: PERRLA, EOMI, no nystagmus, sclera anicteric    ENT: nasal discharge, uvula midline, no oropharyngeal erythema/exudate    Neck: supple, negative JVD, negative carotid bruits, no thyromegaly    Chest: bibasilar crackles    Cardiovascular: regular rate and rhythm, neg murmurs/rubs/gallops    Abdomen: soft, non distended, non tender, negative rebound/guarding, normal bowel sounds, neg hepatosplenomegaly    Extremities: WWP, neg cyanosis/clubbing/edema, negative calf tenderness to palpation, negative Jared's sign    :     Neurologic Exam:    somnolent, opens when name was called     Cranial Nerves:     II:                       pupils equal, round and reactive to light, visual fields intact   III/ IV/VI:             extraocular movements intact, neg nystagmus, ptosis  V:                       facial sensation intact, V1-3 normal  VII:                     face symmetric, no droop, normal eye closure and smile  VIII:                    hearing intact to finger rub bilaterally  IX/ X:                 soft palate rise symmetrical  XI:                      head turning, shoulder shrug normal  XII:                     tongue midline    Motor Exam:    Upper Extremities:        poor effort > 3/5      Lower Extremities:          poor effort > 3/5    Sensory:              intact to LT/PP in all UE/LE dermatomes    DTR:                   = biceps/     triceps/     brachioradialis                            = patella/   medial hamstring/    ankle                            neg clonus                            neg Babinski                            neg Hoffmans    Finger to Nose:  did not participate secondary to somnolence    Heel to Shin:       did not participate secondary to somnolence    Rapid Alternating movements:   did not participate secondary to somnolence    Joint Position Sense:   did not participate secondary to somnolence    Romberg:  not tested    Tandem Walking:  not tested    Gait:  not tested        PM&R Impression:    1) deconditioned  2) AMS  3) gait dysfunction  4) sepsis/PNA       Recommendations:    1) Physical therapy focusing on therapeutic exercises, bed mobility/transfer out of bed evaluation, progressive ambulation with assistive devices.    2) Anticipated Disposition Plan/Recommendations:  daughter requesting d/c home with home physical therapy and continued 24 hr x 7 day home care services

## 2018-04-13 NOTE — PROGRESS NOTE ADULT - ASSESSMENT
Patient is an 87 yo M with history of advanced dementia who presents with cough, admitted with concerns of pneumonia vs aspiration pneumonia Patient is an 89 yo M with history of advanced dementia who presents with cough, admitted with RLL infiltrate on CXR likely 2/2 to aspiration pneumonia on unasyn.

## 2018-04-13 NOTE — PROGRESS NOTE ADULT - PROBLEM SELECTOR PLAN 4
Patient with history of advanced dementia, baseline interactive, stands and walks with assistance, has been lethargic lately. AOx0 per daughter and HHA  - home meds of Trazadone and haldol being held for current lethargy  - check EKG for qtc prolongation  - Fall precautions  - Low threshold for enhanced obs if patient is attempting to get up  - PT eval  - Palliative consult for advanced dementia  - vitamin D level in AM, replete with high dose if low otherwise will start PO vitamin D if pass s/s eval Patient with history of clearing throat and coughing when eating, holding food in mouth, only able to drink thin liquids and raw fish. Concern for aspiration  -S&S said the patient was likely aspirating on all consistencies, would recommend NPO vs pureed thin liquids with known aspiration, but if the patient should go to   - Aspiration precautions patient was tachypneic to the 40's 4/12 AM. 4/13 resolved after haldol, seroquel and tylenol.   -placed on bipap clear lungs  -ABG show respiratory alkalosis and worsened on bipap  -most likely etiology is pain vs agitation  -lactate elevated to 2.6 in AM on repeat <2  -give 1g IV tylenol or PO tylenol PRN as patient responded well yesterday. patient unable to verbalize pain. Acute respiratory alkalosis: patient was tachypneic to the 40's 4/12 AM. 4/13 resolved after haldol, seroquel and tylenol.   -placed on bipap clear lungs  -ABG show respiratory alkalosis and worsened on bipap  -most likely etiology is pain vs agitation  -lactate elevated to 2.6 in AM on repeat <2  -give 1g IV tylenol or PO tylenol PRN as patient responded well yesterday. patient unable to verbalize pain.

## 2018-04-13 NOTE — CONSULT NOTE ADULT - ASSESSMENT
Patient is an 87 yo M with history of advanced dementia who presents with cough, admitted with concerns of pneumonia vs aspiration pneumonia found to be tachypneic to the 40's this morning and was started on bipap.     Problem/Plan - 1:  ·  Problem: Sepsis.  Plan: 2/4 SIRS criteria (bandemia, tachycardia), with pneumonia on hx and cxr. Patient with productive cough for three days, change in mental status, and bandemia to 37% and CXR with RLL consolidation suspicious for pneumonia on my read. Patient has some clearing of throat with food per daughter and HHA, possible aspiration with workup as below. DDX also includes viral (unliklely given negative RVP), advancing dementia, or underlying malignancy given weight loss. However given changing diet 2/2 dysphagia and possible aspiration, lethargy and weight loss are likely linked. Will treat for pneumonia and monitor patient overnight for convalescence. s/p Ceftriaxone IM 1gm at urgent care, then ceftriaxone 1gm and azithro 500 IV in ED  - Unasyn 3 gm IV q6hrs  - Aspiration precautions, HOB >30 degrees.    Problem/Plan - 2:  ·  Problem: Aspiration pneumonia of right lower lobe, unspecified aspiration pneumonia type.  Plan: -F/U S&S, Unasyn 3G Q6H.  -Acute respiratory alkalosis - cont supportive measures.     Problem/Plan - 3:  ·  Problem: Tachypnea.  Plan: Acute hypoxemic respiratory failure: patient was tachypneic to the 40's 4/12 AM, likely 2/2 pneumonia and possibly a new aspiration event overnight  -placed on bipap  -examined with senior resident; clear lungs  -ABG show respiratory alkalosis  -lactate elevated to 2.6 in AM likely 2/2 work of breathing, will repeat  -now tachypnea thought to be 2/2 to anxiety and possibly pain, but patient unable to verbalize given baseline poor mental status.   -continue giving 1mg IV haldol [id number] as at home he has 1mg PO q 8 hours.   -give 1g IV tylenol to see if he relaxes as pain is another possible etiology.    Problem/Plan - 4:  ·  Problem: Abnormal EKG.  Plan: Intraventricular conduction delay. no signs of current ischemia.
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
## 2018-04-13 NOTE — PROGRESS NOTE ADULT - PROBLEM SELECTOR PLAN 4
Patient appears home hospice eligible based on marked decline in functional status over past two months, greater than 10% body weight loss over past 6-8 weeks with albumin of 2.3, aspiration pneumonia, progressive cognitive decline.    FM with Clarice Lara ([facility]) and granddaughter ([id number]) agree to home hospice services, susannah introduced based on needs, MOLST completed (copy placed in chart, copies made for family, original to go home with patient-given to HHA as per family's wishes), d/w mohamud and primary team; c/w Haldol 0.5mg TICD + Seroquel 25mg QHS, stop trazodone Patient appears home hospice eligible based on marked decline in functional status over past two months, greater than 10% body weight loss over past 6-8 weeks with albumin of 2.3, aspiration pneumonia, progressive cognitive decline.    d/w mohamud and primary team; c/w Haldol 0.5mg TID + Seroquel 25mg QHS,  stop trazodone

## 2018-04-13 NOTE — PROGRESS NOTE ADULT - SUBJECTIVE AND OBJECTIVE BOX
KATIE LOZA          MRN-[id number]            (1929)  **NOTE IS IN PROGRESS**    CC: Cough with shortness of breath    HPI:  *History provided by daughter and HHA at bedside. Patient is an 89 yo M with history of advanced dementia who presents with several days of cough and lethargy. At baseline patient has lucid moments rarely, but is interactive and walks with assistance (does not speak anymore), and for the past three or four days has been lethargic and not as interactive, and with cough for the same amount of time. Today the patient was seen to be sleeping most of the day and having more productive cough so was brought to urgent care which then sent patient to ED for infiltrates suspicious for PNA with AMS. Patient otherwise was in usual state of health, although it was noted by daughter and HHA that patient has lost a lot of weight recently, now looks much thinner than 2-4 weeks ago, and has been falling more often. Patient has not had any head injuries but has had multiple bumps and has had a wound on his right knee that has not yet healed. Otherwise ROS per HHA and daughter is negative. S/p 1gm IM ceftriaxone at urgent care center. (2018 17:13)    INTERIM EVENTS: Patient admitted to medical service on 4 Uris, titrated off of Bipap and currently calm: communicating and appropriately answering in 1-2 words.  HHA bedside, states he had a good calm night, however is still paranoid that everyone is "trying to kill him." Patient displays no sx of complaints, says he does not need anything by shaking head, is pleasant.    PAST MEDICAL & SURGICAL HISTORY:  Dementia  No significant past surgical history    FAMILY HISTORY:  No pertinent family history in first degree relatives    Reviewed and     SOCIAL HISTORY: Lives in private apt with 24/7 [facility] for last 30 years.  (wife  from [place] -  new information today) with 2 daughters: 1 is HCProxy (Jane Logan ) as per self report, no documentation available in chart  The second daughter lives in [place] and chooses to be uninvolved.  Patient was very active all his life: took extremely good care of his health, and is a retired  for [employer] retired 30 years prior) Has a grandson and granddaughter who he is very close to ( Jane's children)    ROS:    Unable to attain due to cognitive impairment nonverbal due to advanced dementia                    Dyspnea (Saloni 0-10):      0/10 on NC              N/V (Y/N):                            no indication is nauseous   Secretions (Y/N) :                  + cough  Agitation(Y/N):                      N - not currently  Pain (Y/N):                            N  -Provocation/Palliation:  -Quality/Quantity:  -Radiating:  -Severity:  -Timing/Frequency:  -Impact on ADLs:    General:  unable due to cog impairment  HEENT:    unable due to cog impairment  Neck:  unable due to cog impairment  CVS:  unable due to cog impairment  Resp:  unable due to cog impairment  GI:  unable due to cog impairment  :  unable due to cog impairment  Musc:  unable due to cog impairment  Neuro:  unable due to cog impairment  Psych:  unable due to cog impairment  Skin:  unable due to cog impairment  Lymph:  unable due to cog impairment    Allergies  No Known Drug Allergies  Nuts (Anaphylaxis)  seeds (Anaphylaxis)    Intolerances    Opiate Naive (Y/N):  Yes is opiate naive  -iStop reviewed (Y/N): Reference #: [id number]   Medications:      MEDICATIONS  (STANDING):  ampicillin/sulbactam  IVPB 3 Gram(s) IV Intermittent every 6 hours  dextrose 5% + lactated ringers. 1000 milliLiter(s) (80 mL/Hr) IV Continuous <Continuous>  haloperidol    Injectable 0.5 milliGRAM(s) IV Push every 8 hours  heparin  Injectable 5000 Unit(s) SubCutaneous every 8 hours  potassium chloride  10 mEq/100 mL IVPB 10 milliEquivalent(s) IV Intermittent every 1 hour    Labs:                          12.8   5.8   )-----------( 196      ( 2018 06:12 )             39.2   04-13    144  |  111<H>  |  15  ----------------------------<  96  3.6   |  19<L>  |  0.54    Ca    8.5      2018 06:12  Phos  2.7     04-13  Mg     2.0     -13    TPro  5.6<L>  /  Alb  2.3<L>  /  TBili  0.7  /  DBili  x   /  AST  15  /  ALT  11  /  AlkPhos  71  04-12         PTT - ( 2018 13:36 )  PTT:29.7 sec      Imaging:  Reviewed  EXAM:  XR CHEST PORTABLE URGENT 1V                          PROCEDURE DATE:  2018      INTERPRETATION:  Portable chest    History: Tachypnea follow-up abnormal exam    Right pleural effusion/atelectasis and or infiltrates improving compared   to prior exam earlier same day. Right upper lung and left lung clear.       12 Lead ECG (18 @ 12:51)    Ventricular Rate 84 BPM    Atrial Rate 84 BPM    P-R Interval 136 ms    QRS Duration 132 ms    Q-T Interval 374 ms    QTC Calculation(Bezet) 441 ms    P Axis 61 degrees    R Axis 86 degrees    T Axis 54 degrees    Diagnosis Line Normal sinus rhythmwith sinus arrhythmia  Right bundle branch block  Nonspecific ST - T abnormalities    PEx:  T(C): 36.6 (18 @ 08:55), Max: 37.2 (18 @ 17:30)  HR: 61 (18 @ 08:55) (61 - 77)  BP: 120/80 (18 @ 08:55) (91/58 - 120/80)  RR: 22 (18 @ 08:55) (16 - 22)  SpO2: 96% (18 @ 08:55) (90% - 98%)  Wt(kg): 64kg  Daily       I&O's Summary    2018 07:  -  2018 07:00  --------------------------------------------------------  IN: 1425 mL / OUT: 0 mL / NET: 1425 mL    2018 07:  -  2018 13:59  --------------------------------------------------------  IN: 125 mL / OUT: 0 mL / NET: 125 mL      General:  alert, on guard (ready to move when allowed) in bed with bipap on  HEENT:  At/NC  Neck: no thryomegaly  CVS: s1s2  Resp: + Bipap, + rhonchi + cough, bilateral air entry  GI: + soft, + BS  : condom catheter  Musc:   strength 5/5 all 4 extermitites  Neuro: + dementia, nonverbal, no focal deficits   Psych:   agitated frequently, restless, not  Skin:   grossly intact, bruises noted from previous falls  Lymph: no gross adenopathy  Preadmit Karnofsky: 40 %           Current Karnofsky:  20 %  Cachexia (Y/N): Y  BMI: unknown, height required, but evidence of reduced SQ fat, frailty    Advanced Directives:     DNR/DNI - confirmed with daughter on phone this afternoon, documentation noted in chart confirming DNR/I from 1045am 18     MOLST - hope to complete after meeting HCP/family     DPOA - as per daughter Jane she is POA and HCP     Living Will - not known if specifically has a LWill    Decision maker:  PAtient does nto have capacity to make his own complex medical decisions as he has advanced dementia, nonverbal.  Legal surrogate:  Jane Logan, need to confirm with other daughter or with HCProxy paperwork review    GOALS OF CARE DISCUSSION       Palliative care info/counseling provided - telephone encounter in afternoon (approx 115-130)	           Family meeting - scheduled for  @1230p       Advanced Directives addressed    	          REFERRALS	        Palliative Med  - to evaluate       Unit SW/Case Mgmt              Speech/Swallow - pending       Patient/Family Support       Massage Therapy       Music Therapy       Hospice - will discuss about referral after FM, daughter is looking for extra help, understands patient is "declining in last year"       Nutrition KATIE LOZA          MRN-[id number]            (1929)  **NOTE IS IN PROGRESS**    CC: Cough with shortness of breath    HPI:  *History provided by daughter and HHA at bedside. Patient is an 89 yo M with history of advanced dementia who presents with several days of cough and lethargy. At baseline patient has lucid moments rarely, but is interactive and walks with assistance (does not speak anymore), and for the past three or four days has been lethargic and not as interactive, and with cough for the same amount of time. Today the patient was seen to be sleeping most of the day and having more productive cough so was brought to urgent care which then sent patient to ED for infiltrates suspicious for PNA with AMS. Patient otherwise was in usual state of health, although it was noted by daughter and HHA that patient has lost a lot of weight recently, now looks much thinner than 2-4 weeks ago, and has been falling more often. Patient has not had any head injuries but has had multiple bumps and has had a wound on his right knee that has not yet healed. Otherwise ROS per HHA and daughter is negative. S/p 1gm IM ceftriaxone at urgent care center. (2018 17:13)    INTERIM EVENTS: Patient admitted to medical service on 4 Uris, titrated off of Bipap and currently calm: communicating and appropriately answering in 1-2 words.  HHA bedside, states he had a good calm night, however is still paranoid that everyone is "trying to kill him." Patient displays no sx of complaints, says he does not need anything by shaking head, is pleasant.    PAST MEDICAL & SURGICAL HISTORY:  Dementia  No significant past surgical history    FAMILY HISTORY:  No pertinent family history in first degree relatives    Reviewed and     SOCIAL HISTORY: Lives in private apt with 24/7 [facility] for last 30 years.  (wife  from [place] -  new information today) with 2 daughters: 1 is HCProxy (Jane Logan ) as per self report, no documentation available in chart  The second daughter lives in [place] and chooses to be uninvolved.  Patient was very active all his life: took extremely good care of his health, and is a retired  for [employer] retired 30 years prior) Has a grandson and granddaughter who he is very close to ( Jane's children)    ROS:    Unable to attain due to cognitive impairment nonverbal due to advanced dementia                    Dyspnea (Saloni 0-10):      0/10 on NC              N/V (Y/N):                            no indication is nauseous   Secretions (Y/N) :                  + cough  Agitation(Y/N):                      N - not currently  Pain (Y/N):                            N  -Provocation/Palliation:  -Quality/Quantity:  -Radiating:  -Severity:  -Timing/Frequency:  -Impact on ADLs:    General:  unable due to cog impairment  HEENT:    unable due to cog impairment  Neck:  unable due to cog impairment  CVS:  unable due to cog impairment  Resp:  unable due to cog impairment  GI:  unable due to cog impairment  :  unable due to cog impairment  Musc:  unable due to cog impairment  Neuro:  unable due to cog impairment  Psych:  unable due to cog impairment  Skin:  unable due to cog impairment  Lymph:  unable due to cog impairment    Allergies  No Known Drug Allergies  Nuts (Anaphylaxis)  seeds (Anaphylaxis)    Intolerances    Opiate Naive (Y/N):  Yes is opiate naive  -iStop reviewed (Y/N): Reference #: [id number]   Medications:      MEDICATIONS  (STANDING):  ampicillin/sulbactam  IVPB 3 Gram(s) IV Intermittent every 6 hours  dextrose 5% + lactated ringers. 1000 milliLiter(s) (80 mL/Hr) IV Continuous <Continuous>  haloperidol    Injectable 0.5 milliGRAM(s) IV Push every 8 hours  heparin  Injectable 5000 Unit(s) SubCutaneous every 8 hours  potassium chloride  10 mEq/100 mL IVPB 10 milliEquivalent(s) IV Intermittent every 1 hour    Labs:                          12.8   5.8   )-----------( 196      ( 2018 06:12 )             39.2   04-13    144  |  111<H>  |  15  ----------------------------<  96  3.6   |  19<L>  |  0.54    Ca    8.5      2018 06:12  Phos  2.7     04-13  Mg     2.0     -13    TPro  5.6<L>  /  Alb  2.3<L>  /  TBili  0.7  /  DBili  x   /  AST  15  /  ALT  11  /  AlkPhos  71  04-12         PTT - ( 2018 13:36 )  PTT:29.7 sec      Imaging:  Reviewed  EXAM:  XR CHEST PORTABLE URGENT 1V                          PROCEDURE DATE:  2018      INTERPRETATION:  Portable chest    History: Tachypnea follow-up abnormal exam    Right pleural effusion/atelectasis and or infiltrates improving compared   to prior exam earlier same day. Right upper lung and left lung clear.       12 Lead ECG (18 @ 12:51)    Ventricular Rate 84 BPM    Atrial Rate 84 BPM    P-R Interval 136 ms    QRS Duration 132 ms    Q-T Interval 374 ms    QTC Calculation(Bezet) 441 ms    P Axis 61 degrees    R Axis 86 degrees    T Axis 54 degrees    Diagnosis Line Normal sinus rhythmwith sinus arrhythmia  Right bundle branch block  Nonspecific ST - T abnormalities    PEx:  Vital Signs Last 24 Hrs  T(C): 36.5 (2018 08:57), Max: 36.7 (2018 04:40)  T(F): 97.7 (2018 08:57), Max: 98 (2018 04:40)  HR: 70 (2018 11:00) (60 - 81)  BP: 161/70 (2018 08:57) (128/65 - 161/70)  BP(mean): --  RR: 22 (2018 08:57) (20 - 22)  SpO2: 93% (2018 11:00) (93% - 100%)  wt: 64kg    I&O's Summary    2018 07:  -  2018 07:00  --------------------------------------------------------  IN: 1425 mL / OUT: 0 mL / NET: 1425 mL    2018 07:  -  2018 13:59  --------------------------------------------------------  IN: 125 mL / OUT: 0 mL / NET: 125 mL      General:  alert, sitting out of bed to chair, calm, answering minimally but appropriately  HEENT:  At/NC  Neck: no thryomegaly  CVS: s1s2  Resp: + Nasal cannula+ rhonchi R> L + cough, bilateral air entry  GI: + soft, + BS  : condom catheter  Musc:   strength 5/5 all 4 extremitites  Neuro: + dementia, nonverbal, no focal deficits   Psych:   calm  Skin:   grossly intact, bruises noted from previous falls  Lymph: no gross adenopathy  Preadmit Karnofsky: 40 %           Current Karnofsky:  40 %  Cachexia (Y/N): Y  BMI: weight assessed interim since initial consult, BMI 20%, has had weight loss of approx 30 lbs over past 6-8 weeks    Advanced Directives:     DNR/DNI - confirmed with daughter: documentation noted in chart confirming DNR/I from 1045am 18, MOLSt completed 18     MOLST - hope to complete after meeting HCP/family     DPOA - as per daughter Jane she is POA and HCP     Living Will - not known if specifically has a LWill    Decision maker:  PAtient does nto have capacity to make his own complex medical decisions as he has advanced dementia, nonverbal.  Legal surrogate:  Jane Logan, need to confirm with other daughter or with HCProxy paperwork review    GOALS OF CARE DISCUSSION       Palliative care info/counseling provided - FM with [facility]) and granddaughter (1230p-130)	           Advanced Directives addressed    	 MOLST completed today 120pm: DNR/I, no feeding tubes          REFERRALS	        Unit SW/Case Mgmt       Speech/Swallow - recommended feeding tube or dysphagia diet with known risk of aspiration family does not want to pursue Feeding tubes        Hospice - [facility] referral made       Nutrition - dysphagia diet counseling KATIE LOZA          MRN-[id number]            (1929)  **NOTE IS IN PROGRESS**    CC: Cough with shortness of breath    HPI:  *History provided by daughter and HHA at bedside. Patient is an 87 yo M with history of advanced dementia who presents with several days of cough and lethargy. At baseline patient has lucid moments rarely, but is interactive and walks with assistance (does not speak anymore), and for the past three or four days has been lethargic and not as interactive, and with cough for the same amount of time. Today the patient was seen to be sleeping most of the day and having more productive cough so was brought to urgent care which then sent patient to ED for infiltrates suspicious for PNA with AMS. Patient otherwise was in usual state of health, although it was noted by daughter and HHA that patient has lost a lot of weight recently, now looks much thinner than 2-4 weeks ago, and has been falling more often. Patient has not had any head injuries but has had multiple bumps and has had a wound on his right knee that has not yet healed. Otherwise ROS per HHA and daughter is negative. S/p 1gm IM ceftriaxone at urgent care center. (2018 17:13)    INTERIM EVENTS: Patient admitted to medical service on 4 Uris, titrated off of Bipap and currently calm: communicating and appropriately answering in 1-2 words.  HHA bedside, states he had a good calm night, however is still paranoid that everyone is "trying to kill him." Patient displays no sx of complaints, says he does not need anything by shaking head, is pleasant.    PAST MEDICAL & SURGICAL HISTORY:  Dementia  No significant past surgical history    FAMILY HISTORY:  No pertinent family history in first degree relatives    Reviewed and     SOCIAL HISTORY: Lives in private apt with 24[facility]7 [facility] for last 30 years.  (wife  from [place] -  new information today) with 2 daughters: 1 is HCProxy (Jane Logan ) as per self report, no documentation available in chart  The second daughter lives in [place] and chooses to be uninvolved.  Patient was very active all his life: took extremely good care of his health, and is a retired  for [employer] retired 30 years prior) Has a grandson and granddaughter who he is very close to ( Jane's children)    ROS:    Unable to attain due to cognitive impairment nonverbal due to advanced dementia                    Dyspnea (Saloni 0-10):      0/10 on NC              N/V (Y/N):                            no indication is nauseous   Secretions (Y/N) :                  + cough  Agitation(Y/N):                      N - not currently  Pain (Y/N):                            N  -Provocation/Palliation:  -Quality/Quantity:  -Radiating:  -Severity:  -Timing/Frequency:  -Impact on ADLs:    General:  unable due to cog impairment  HEENT:    unable due to cog impairment  Neck:  unable due to cog impairment  CVS:  unable due to cog impairment  Resp:  unable due to cog impairment  GI:  unable due to cog impairment  :  unable due to cog impairment  Musc:  unable due to cog impairment  Neuro:  unable due to cog impairment  Psych:  unable due to cog impairment  Skin:  unable due to cog impairment  Lymph:  unable due to cog impairment    Allergies  No Known Drug Allergies  Nuts (Anaphylaxis)  seeds (Anaphylaxis)    Intolerances    Opiate Naive (Y/N):  Yes is opiate naive  -iStop reviewed (Y/N): Reference #: [id number]   Medications:      MEDICATIONS  (STANDING):  ampicillin/sulbactam  IVPB 3 Gram(s) IV Intermittent every 6 hours  dextrose 5% + lactated ringers. 1000 milliLiter(s) (80 mL/Hr) IV Continuous <Continuous>  haloperidol    Injectable 0.5 milliGRAM(s) IV Push every 8 hours  heparin  Injectable 5000 Unit(s) SubCutaneous every 8 hours  potassium chloride  10 mEq/100 mL IVPB 10 milliEquivalent(s) IV Intermittent every 1 hour    Labs:                          12.8   5.8   )-----------( 196      ( 2018 06:12 )             39.2   04-13    144  |  111<H>  |  15  ----------------------------<  96  3.6   |  19<L>  |  0.54    Ca    8.5      2018 06:12  Phos  2.7     04-13  Mg     2.0     -13    TPro  5.6<L>  /  Alb  2.3<L>  /  TBili  0.7  /  DBili  x   /  AST  15  /  ALT  11  /  AlkPhos  71  04-12         PTT - ( 2018 13:36 )  PTT:29.7 sec      Imaging:  Reviewed  EXAM:  XR CHEST PORTABLE URGENT 1V                          PROCEDURE DATE:  2018      INTERPRETATION:  Portable chest    History: Tachypnea follow-up abnormal exam    Right pleural effusion/atelectasis and or infiltrates improving compared   to prior exam earlier same day. Right upper lung and left lung clear.       12 Lead ECG (18 @ 12:51)    Ventricular Rate 84 BPM    Atrial Rate 84 BPM    P-R Interval 136 ms    QRS Duration 132 ms    Q-T Interval 374 ms    QTC Calculation(Bezet) 441 ms    P Axis 61 degrees    R Axis 86 degrees    T Axis 54 degrees    Diagnosis Line Normal sinus rhythmwith sinus arrhythmia  Right bundle branch block  Nonspecific ST - T abnormalities    PEx:  Vital Signs Last 24 Hrs  T(C): 36.5 (2018 08:57), Max: 36.7 (2018 04:40)  T(F): 97.7 (2018 08:57), Max: 98 (2018 04:40)  HR: 70 (2018 11:00) (60 - 81)  BP: 161/70 (2018 08:57) (128/65 - 161/70)  BP(mean): --  RR: 22 (2018 08:57) (20 - 22)  SpO2: 93% (2018 11:00) (93% - 100%)  wt: 64kg    I&O's Summary    2018 07:  -  2018 07:00  --------------------------------------------------------  IN: 1425 mL / OUT: 0 mL / NET: 1425 mL    2018 07:  -  2018 13:59  --------------------------------------------------------  IN: 125 mL / OUT: 0 mL / NET: 125 mL      General:  alert, sitting out of bed to chair, calm, answering minimally but appropriately  HEENT:  At/NC  Neck: no thryomegaly  CVS: s1s2  Resp: + Nasal cannula+ rhonchi R> L + cough, bilateral air entry  GI: + soft, + BS  : condom catheter  Musc:   strength 5/5 all 4 extremitites  Neuro: + dementia, nonverbal, no focal deficits   Psych:   calm  Skin:   grossly intact, bruises noted from previous falls  Lymph: no gross adenopathy  Preadmit Karnofsky: 40 %           Current Karnofsky:  40 %  Cachexia (Y/N): Y  BMI: weight assessed interim since initial consult, BMI 20%, has had weight loss of approx 30 lbs over past 6-8 weeks    Advanced Directives:     DNR/DNI - confirmed with daughter: documentation noted in chart confirming DNR/I from 1045am 18, MOLSt completed 18     MOLST - completed 18: DNR/I, no feeding tubes     DPOA - as per daughter Jane she is POA and HCP    Decision maker:  Patient does not have capacity to make his own complex medical decisions as he has advanced dementia, nonverbal.  Legal surrogate:  Jane Logan, need to confirm with other daughter or with HCProxy paperwork review    GOALS OF CARE DISCUSSION       Palliative care info/counseling provided -  with Clarice Lara (health aid) and granddaughter (1230p-130p today)	           Advanced Directives addressed    	 MOLST completed today 120pm: DNR/I, no feeding tubes          REFERRALS	        Unit SW/Case Mgmt       Speech/Swallow - recommended feeding tube or dysphagia diet with known risk of aspiration family does not want to pursue feeding tubes        Hospice - [facility] referral made       Nutrition - dysphagia diet counseling KATIE LOZA          [id number]            (1929)  **NOTE IS IN PROGRESS**    CC: Cough with shortness of breath    HPI:  *History provided by daughter and HHA at bedside. Patient is an 89 yo M with history of advanced dementia who presents with several days of cough and lethargy. At baseline patient has lucid moments rarely, but is interactive and walks with assistance (does not speak anymore), and for the past three or four days has been lethargic and not as interactive, and with cough for the same amount of time. Today the patient was seen to be sleeping most of the day and having more productive cough so was brought to urgent care which then sent patient to ED for infiltrates suspicious for PNA with AMS. Patient otherwise was in usual state of health, although it was noted by daughter and HHA that patient has lost a lot of weight recently, now looks much thinner than 2-4 weeks ago, and has been falling more often. Patient has not had any head injuries but has had multiple bumps and has had a wound on his right knee that has not yet healed. Otherwise ROS per HHA and daughter is negative. S/p 1gm IM ceftriaxone at urgent care center. (2018 17:13)    INTERIM EVENTS: Patient admitted to medical service on 4 Uris, titrated off of Bipap and currently calm: communicating and appropriately answering in 1-2 words.  HHA bedside, states he had a good calm night, however is still paranoid that everyone is "trying to kill him." Patient displays no sx of complaints, says he does not need anything by shaking head, is pleasant.    PAST MEDICAL & SURGICAL HISTORY:  Dementia  No significant past surgical history    FAMILY HISTORY:  No pertinent family history in first degree relatives    Reviewed and     SOCIAL HISTORY: Lives in private apt with 24/7 [facility] for last 30 years.  (wife  from [place] -  new information today) with 2 daughters: 1 is HCProxy (Jane Logan ) as per self report, no documentation available in chart  The second daughter lives in [place] and chooses to be uninvolved.  Patient was very active all his life: took extremely good care of his health, and is a retired  for [employer] retired 30 years prior) Has a grandson and granddaughter who he is very close to ( Jane's children)    ROS:    Unable to attain due to cognitive impairment nonverbal due to advanced dementia  Daughter at bedside.  Had speech and swallow eval today.  Showed evidence of pooling poor swallow initiation and at high risk of aspiration.  Speech and swallow discussed options of careful feeding vs PEG, but sis not get into specifics per daughter                    Dyspnea (Saloni 0-10):      0/10 on NC              N/V (Y/N):                            no indication is nauseous   Secretions (Y/N) :                  + cough  Agitation(Y/N):                      N - not currently  Pain (Y/N):                            N  -Provocation/Palliation:  -Quality/Quantity:  -Radiating:  -Severity:  -Timing/Frequency:  -Impact on ADLs:    General:  unable due to cog impairment  HEENT:    unable due to cog impairment  Neck:  unable due to cog impairment  CVS:  unable due to cog impairment  Resp:  unable due to cog impairment  GI:  unable due to cog impairment  :  unable due to cog impairment  Musc:  unable due to cog impairment  Neuro:  unable due to cog impairment  Psych:  unable due to cog impairment  Skin:  unable due to cog impairment  Lymph:  unable due to cog impairment    Allergies  No Known Drug Allergies  Nuts (Anaphylaxis)  seeds (Anaphylaxis)    Intolerances    Opiate Naive (Y/N):  Yes is opiate naive  -iStop reviewed (Y/N): Reference #: [id number]   Medications:      MEDICATIONS  (STANDING):  ampicillin/sulbactam  IVPB 3 Gram(s) IV Intermittent every 6 hours  dextrose 5% + lactated ringers. 1000 milliLiter(s) (80 mL/Hr) IV Continuous <Continuous>  haloperidol    Injectable 0.5 milliGRAM(s) IV Push every 8 hours  heparin  Injectable 5000 Unit(s) SubCutaneous every 8 hours  potassium chloride  10 mEq/100 mL IVPB 10 milliEquivalent(s) IV Intermittent every 1 hour    Labs:                          12.8   5.8   )-----------( 196      ( 2018 06:12 )             39.2   04-13    144  |  111<H>  |  15  ----------------------------<  96  3.6   |  19<L>  |  0.54    Ca    8.5      2018 06:12  Phos  2.7     04-13  Mg     2.0     04-13    TPro  5.6<L>  /  Alb  2.3<L>  /  TBili  0.7  /  DBili  x   /  AST  15  /  ALT  11  /  AlkPhos  71  04-12         PTT - ( 2018 13:36 )  PTT:29.7 sec      Imaging:  Reviewed  EXAM:  XR CHEST PORTABLE URGENT 1V                          PROCEDURE DATE:  2018      INTERPRETATION:  Portable chest    History: Tachypnea follow-up abnormal exam    Right pleural effusion/atelectasis and or infiltrates improving compared   to prior exam earlier same day. Right upper lung and left lung clear.       12 Lead ECG (18 @ 12:51)    Ventricular Rate 84 BPM    Atrial Rate 84 BPM    P-R Interval 136 ms    QRS Duration 132 ms    Q-T Interval 374 ms    QTC Calculation(Bezet) 441 ms    P Axis 61 degrees    R Axis 86 degrees    T Axis 54 degrees    Diagnosis Line Normal sinus rhythmwith sinus arrhythmia  Right bundle branch block  Nonspecific ST - T abnormalities    PEx:  Vital Signs Last 24 Hrs  T(C): 36.5 (2018 08:57), Max: 36.7 (2018 04:40)  T(F): 97.7 (2018 08:57), Max: 98 (2018 04:40)  HR: 70 (2018 11:00) (60 - 81)  BP: 161/70 (2018 08:57) (128/65 - 161/70)  BP(mean): --  RR: 22 (2018 08:57) (20 - 22)  SpO2: 93% (2018 11:00) (93% - 100%)  wt: 64kg    I&O's Summary    2018 07:  -  2018 07:00  --------------------------------------------------------  IN: 1425 mL / OUT: 0 mL / NET: 1425 mL    2018 07:  -  2018 13:59  --------------------------------------------------------  IN: 125 mL / OUT: 0 mL / NET: 125 mL      General:  alert, sitting out of bed to chair, calm, answering minimally but appropriately  HEENT:  At/NC  Neck: no thryomegaly  CVS: s1s2  Resp: + Nasal cannula+ rhonchi R> L + cough, bilateral air entry  GI: + soft, + BS  : condom catheter  Musc:   strength 5/5 all 4 extremitites  Neuro: + dementia, nonverbal, no focal deficits   Psych:   calm  Skin:   grossly intact, bruises noted from previous falls  Lymph: no gross adenopathy  Preadmit Karnofsky: 40 %           Current Karnofsky:  30 %  Cachexia (Y/N): Y  BMI: weight assessed interim since initial consult, BMI 20%, has had weight loss of approx 30 lbs over past 6-8 weeks    Advanced Directives:     DNR/DNI - confirmed with daughter: documentation noted in chart confirming DNR/I from 1045am 18, MOLSt completed 18     MOLST - completed 18: DNR/I, no feeding tubes     DPOA - as per daughter Jane she is POA and HCP    Decision maker:  Patient does not have capacity to make his own complex medical decisions as he has advanced dementia, nonverbal.  Legal surrogate:  Jane Doreen, need to confirm with other daughter or with HCProxy paperwork review    GOALS OF CARE DISCUSSION       Palliative care info/counseling provided -  with Clarice Lara (health [facility]) daughter and granddaughter	           Advanced Directives addressed    	 MOLST completed today 120pm: DNR/I, no feeding tubes see Advance Care Planning note  in second Pall Med progress note          REFERRALS	        Unit SW/Case Mgmt       Speech/Swallow - recommended feeding tube or dysphagia diet with known risk of aspiration family does not want to pursue feeding tubes        [facility]       Nutrition - dysphagia diet counseling

## 2018-04-13 NOTE — SWALLOW BEDSIDE ASSESSMENT ADULT - ASR SWALLOW ASPIRATION MONITOR
gurgly voice/pneumonia/upper respiratory infection/oral hygiene/fever/cough/change of breathing pattern/position upright (90Y)/throat clearing

## 2018-04-13 NOTE — DISCHARGE NOTE ADULT - VISION (WITH CORRECTIVE LENSES IF THE PATIENT USUALLY WEARS THEM):
Unable to assess. Patient lethargic upon admission to unit/Severely impaired: cannot locate objects without hearing or touching them or patient nonresponsive.

## 2018-04-13 NOTE — BEHAVIORAL HEALTH ASSESSMENT NOTE - NSBHCONSULTMEDS_PSY_A_CORE FT
Recommend d/c of trazodone.  Recommend cross taper from Haldol to Seroquel: Decrease Haldol to 0.5 mg po TID. Start Seroquel 25 mg po qhs.

## 2018-04-13 NOTE — DISCHARGE NOTE ADULT - MEDICATION SUMMARY - MEDICATIONS TO STOP TAKING
I will STOP taking the medications listed below when I get home from the hospital:    traZODone    traZODone 50 mg oral tablet  -- 2 tab(s) by mouth once a day (at bedtime)

## 2018-04-13 NOTE — PROGRESS NOTE ADULT - PROBLEM SELECTOR PLAN 1
chronicity of aspiration in ad. dementia and Parkinson's symptoms discussed with daughter:  She understands patient likely to re-aspirate, has been aspirating silently and intubation nor feeding tube will stop that illness trajectory.  Has decided to c/w small spoon feeds as tolerated, hospice care presented in an effort to treat patient along comfort care measures and avoid further hospitalizations for inevitable recurrance of Asp Pna chronicity of aspiration in ad. dementia and Parkinson's symptoms discussed with daughter:  She understands patient likely to re-aspirate, has been aspirating silently and intubation nor feeding tube will stop that illness trajectory.  Has decided to c/w small spoon feeds as tolerated, hospice care presented in an effort to treat patient along comfort care measures and avoid further hospitalizations for inevitable recurrance of Asp Pna    Clinically improved, although continues to require nasal o2

## 2018-04-13 NOTE — SWALLOW BEDSIDE ASSESSMENT ADULT - SWALLOW EVAL: RECOMMENDED FEEDING/EATING TECHNIQUES
no straws/position upright (90 degrees)/alternate food with liquid/IF PO diet is desired/check mouth frequently for oral residue/pocketing/crush medication (when feasible)/allow for swallow between intakes/maintain upright posture during/after eating for 30 mins/oral hygiene/small sips/bites

## 2018-04-13 NOTE — SWALLOW BEDSIDE ASSESSMENT ADULT - SLP GENERAL OBSERVATIONS
Awake, alert sitting upright in chair. Sp02 at 97%. Daughter and HHA present at bedside noting Pt is agitated and hungry. GOC thoroughly discussed. Pt daughter extensively educated on aspiration risk w/ PO diet vs alternative means of nutrition/hydration. Pt daughter expressed understanding that objective testing may NOT be warranted given Pt noncompliance w/ restricted diet (thickened liquids) and disinterest in PEG/NGT. Pt daughter says " I time to think and talk to my kids." Will continue to follow.  Medical team aware.

## 2018-04-13 NOTE — BEHAVIORAL HEALTH ASSESSMENT NOTE - OTHER PAST PSYCHIATRIC HISTORY (INCLUDE DETAILS REGARDING ONSET, COURSE OF ILLNESS, INPATIENT/OUTPATIENT TREATMENT)
No formal hx of inpt/outpt rx/tx.    Per daughter, pt "always had a foul mouth," was short-tempered and cantankerous.

## 2018-04-13 NOTE — SWALLOW BEDSIDE ASSESSMENT ADULT - SLP PERTINENT HISTORY OF CURRENT PROBLEM
87 y/o M w/ advanced dementia admitted for concerns of pneumonia vs aspiration PNA. CXR 4/12 shows infiltrates/effusions in right lower lung similar to minimally increasing in appearance compared to prior exam. WBC is WNL.

## 2018-04-13 NOTE — PROGRESS NOTE ADULT - PROBLEM SELECTOR PLAN 7
NPO awaiting s/s eval  Continue NS at 125 for hydration  Replete lytes to keep K>4 and Mg>2 NPO awaiting family decision on diet/comfort feeds  Continue NS at 125 for hydration  Replete lytes to keep K>4 and Mg>2 Fall precautions, PT as above.  - B12, TSH, t4 wnl  -f/u RPR.

## 2018-04-13 NOTE — PHYSICAL THERAPY INITIAL EVALUATION ADULT - ADDITIONAL COMMENTS
Pt lives alone with 24 hours x 7 days HHA in an elevator building, no stair. Prior to admission, pt ambulated with rolling walker with assistance. Pt has hospital bed, wheel chair, rolling walker at home

## 2018-04-13 NOTE — PROGRESS NOTE ADULT - PROBLEM SELECTOR PLAN 3
As above Patient with history of advanced dementia, baseline interactive, stands and walks with assistance, has been lethargic lately. AOx0 per daughter and HHA, but A&Ox1 today  - home meds of Trazadone and haldol being held for current lethargy  -geriatric psych recommending haldol 0.5 PO Q 8 hours and seroquel 25mg QHS.   - EKG for qtc 440  - Fall precautions  - Low threshold for enhanced obs if patient is attempting to get up  - PT eval; home PT  - Palliative consult for advanced dementia, following and discussions about home hospice  - vitamin D wnl Patient with history of clearing throat and coughing when eating, holding food in mouth, only able to drink thin liquids and raw fish. Concern for aspiration  -S&S said the patient was likely aspirating on all consistencies, would recommend NPO vs pureed thin liquids with known aspiration, but if the patient should go to   - Aspiration precautions

## 2018-04-13 NOTE — DISCHARGE NOTE ADULT - PLAN OF CARE
complete antibiotics You came in with symptoms of infection and were found to have a right lower lobe pneumonia likely from aspiration of food. Speech and swallow saw you in the hospital and recommends NPO vs pureed thin liquids with known aspiration. the HCP decided to initiate family supervised comfort feeds with known aspiration risk. You were started on unasyn for your infection and your symptoms improved. You are aspirating while eating. Speech and swallow recommended NPO vs pureed thin liquids with known aspiration. the HCP decided to initiate family supervised comfort feeds with known aspiration risk. You were found to have You should continue to take haldol 0.5mg PO TID. You should continue to take seroquel 25mg at bedtime. The patient is DNR/DNI and his health care proxy decided to initiate comfort feeds with known risks. Your mental status has improved after lowering the dose of your haldol and treating your infection. You should continue to take haldol 0.5mg PO TID. You should continue to take seroquel 25mg at bedtime. You were found to have an infection in your lungs due to aspiration. Your infection improved with antibiotics. You should continue antibiotics until complete. You have lost a lot of weight due to decreased PO intake. You will go home with home hospice.

## 2018-04-13 NOTE — PROGRESS NOTE ADULT - PROBLEM SELECTOR PLAN 8
Padua score 5, chemical ppx indicated, if CT head clear Heparin SQ 5000units Q8hrs  No indication for stress ulcer ppx at this time    FULL CODE - confirmed with HCP daughter Jane Logan [phone]    Dispo: Admit to [facility] Heparin SQ 5000units Q8hrs  No indication for stress ulcer ppx at this time  FULL CODE - confirmed with HCP daughter Jane Logan [phone]  Dispo: [facility] family considering home hospice NPO awaiting family decision on diet/comfort feeds  Continue NS at 125 for hydration  Replete lytes to keep K>4 and Mg>2

## 2018-04-13 NOTE — BEHAVIORAL HEALTH ASSESSMENT NOTE - HPI (INCLUDE ILLNESS QUALITY, SEVERITY, DURATION, TIMING, CONTEXT, MODIFYING FACTORS, ASSOCIATED SIGNS AND SYMPTOMS)
HPI from admit:  "*History provided by daughter and HHA at bedside. Patient is an 87 yo M with history of advanced dementia who presents with several days of cough and lethargy. At baseline patient has lucid moments rarely, but is interactive and walks with assistance (does not speak anymore), and for the past three or four days has been lethargic and not as interactive, and with cough for the same amount of time. Today the patient was seen to be sleeping most of the day and having more productive cough so was brought to urgent care which then sent patient to ED for infiltrates suspicious for PNA with AMS. Patient otherwise was in usual state of health, although it was noted by daughter and HHA that patient has lost a lot of weight recently, now looks much thinner than 2-4 weeks ago, and has been falling more often. Patient has not had any head injuries but has had multiple bumps and has had a wound on his right knee that has not yet healed. Otherwise ROS per HHA and daughter is negative. S/p 1gm IM ceftriaxone at urgent care center.     ED vitals: T(F): 99.8 HR: 77 BP: 103/65  RR: 14SpO2: 94%   ED administration: Ceftriaxone 1gm IV, Azithromycin 500mg IV. 2L NS bolus" (11 Apr 2018 17:13) HPI from admit:  "*History provided by daughter and HHA at bedside. Patient is an 89 yo M with history of advanced dementia who presents with several days of cough and lethargy. At baseline patient has lucid moments rarely, but is interactive and walks with assistance (does not speak anymore), and for the past three or four days has been lethargic and not as interactive, and with cough for the same amount of time. Today the patient was seen to be sleeping most of the day and having more productive cough so was brought to urgent care which then sent patient to ED for infiltrates suspicious for PNA with AMS. Patient otherwise was in usual state of health, although it was noted by daughter and HHA that patient has lost a lot of weight recently, now looks much thinner than 2-4 weeks ago, and has been falling more often. Patient has not had any head injuries but has had multiple bumps and has had a wound on his right knee that has not yet healed. Otherwise ROS per HHA and daughter is negative. S/p 1gm IM ceftriaxone at urgent care center.     ED vitals: T(F): 99.8 HR: 77 BP: 103/65  RR: 14SpO2: 94%   ED administration: Ceftriaxone 1gm IV, Azithromycin 500mg IV. 2L NS bolus" (11 Apr 2018 17:13)    Psychiatry consult requested for medication evaluation: Pt currently on Haldol, has cogwheeling and ?akathisia. HPI from admit:  "*History provided by daughter and HHA at bedside. Patient is an 89 yo M with history of advanced dementia who presents with several days of cough and lethargy. At baseline patient has lucid moments rarely, but is interactive and walks with assistance (does not speak anymore), and for the past three or four days has been lethargic and not as interactive, and with cough for the same amount of time. Today the patient was seen to be sleeping most of the day and having more productive cough so was brought to urgent care which then sent patient to ED for infiltrates suspicious for PNA with AMS. Patient otherwise was in usual state of health, although it was noted by daughter and HHA that patient has lost a lot of weight recently, now looks much thinner than 2-4 weeks ago, and has been falling more often. Patient has not had any head injuries but has had multiple bumps and has had a wound on his right knee that has not yet healed. Otherwise ROS per HHA and daughter is negative. S/p 1gm IM ceftriaxone at urgent care center.     ED vitals: T(F): 99.8 HR: 77 BP: 103/65  RR: 14SpO2: 94%   ED administration: Ceftriaxone 1gm IV, Azithromycin 500mg IV. 2L NS bolus" (11 Apr 2018 17:13)    Psychiatry consult requested for medication evaluation: Pt currently on Haldol, has cogwheeling and ?akathisia.    Per daughter and long-time HHA, pt rx'ed with Haldol 0.5 mg TID, dose increased to 1 mg TID ~3-4 months ago, due to behavioral disturbance due to dementia. Pt can be combative and aggressive, also has intermittent paranoid delusions including believing his HHA is poisoning him. Family and HHA note that since Haldol dose was increased, his gait is more shuffling, he is also frequently restless, trying to get up out of his chair, moving his legs up and down, etc. Since admit, team notes that pt also has cogwheel rigidity.    Pt also rxed with trazodone 50 mg po qhs, which both HHA and daughter not has not been effective in promoting sleep.

## 2018-04-13 NOTE — PROGRESS NOTE ADULT - PROBLEM SELECTOR PLAN 5
Patient with history of clearing throat and coughing when eating, holding food in mouth, only able to drink thin liquids and raw fish. Concern for aspiration  - NPO until S/S eval  - f/u S/S recs  - Aspiration precautions Patient has weight loss and inability to eat in the recent past  -aspiration precautions Patient with history of advanced dementia, baseline interactive, stands and walks with assistance, has been lethargic lately. AOx0 per daughter and HHA, but A&Ox1 today  - home meds of Trazadone and haldol being held for current lethargy  -geriatric psych recommending haldol 0.5 PO Q 8 hours and seroquel 25mg QHS.   - EKG for qtc 440  - Fall precautions  - Low threshold for enhanced obs if patient is attempting to get up  - PT eval; home PT  - Palliative consult for advanced dementia, following and discussions about home hospice  - vitamin D wnl

## 2018-04-13 NOTE — DISCHARGE NOTE ADULT - MEDICATION SUMMARY - MEDICATIONS TO TAKE
I will START or STAY ON the medications listed below when I get home from the hospital:    acetaminophen 325 mg oral tablet  -- 2 tab(s) by mouth every 6 hours, As needed, Moderate Pain (4 - 6)  -- Indication: For Pain    haloperidol 0.5 mg oral tablet  -- 1 tab(s) by mouth every 8 hours  -- Indication: For Agitation    QUEtiapine 25 mg oral tablet  -- 1 tab(s) by mouth once a day (at bedtime)  -- Indication: For Agitation    ampicillin-sulbactam  -- 3 gram(s) intravenous every 6 hours last day 4/18/18  -- Indication: For PNEUMONIA I will START or STAY ON the medications listed below when I get home from the hospital:    acetaminophen 325 mg oral tablet  -- 2 tab(s) by mouth every 6 hours, As needed, Moderate Pain (4 - 6)  -- Indication: For Pain    haloperidol 0.5 mg oral tablet  -- 1 tab(s) by mouth every 8 hours  -- Indication: For Agitation    QUEtiapine 25 mg oral tablet  -- 1 tab(s) by mouth once a day (at bedtime)  -- Indication: For Agitation    Augmentin 250 mg-62.5 mg/5 mL oral liquid  -- 10 milliliter(s) by mouth every 8 hours last day is 4/18/18  -- Indication: For PNEUMONIA I will START or STAY ON the medications listed below when I get home from the hospital:    acetaminophen 325 mg oral tablet  -- 2 tab(s) by mouth every 6 hours, As needed, Moderate Pain (4 - 6)  -- Indication: For Pain    QUEtiapine 25 mg oral tablet  -- 1 tab(s) by mouth once a day (at bedtime)  -- Indication: For Agitation    QUEtiapine 25 mg oral tablet  -- 1 tab(s) by mouth once a day (at bedtime)  -- Indication: For Agitation    haloperidol 0.5 mg oral tablet  -- 1 tab(s) by mouth every 8 hours  -- Indication: For Dementia    Augmentin 250 mg-62.5 mg/5 mL oral liquid  -- 10 milliliter(s) by mouth every 8 hours last day is 4/18/18  -- Indication: For Pneumonia

## 2018-04-14 LAB
ANION GAP SERPL CALC-SCNC: 9 MMOL/L — SIGNIFICANT CHANGE UP (ref 5–17)
BUN SERPL-MCNC: 12 MG/DL — SIGNIFICANT CHANGE UP (ref 7–23)
CALCIUM SERPL-MCNC: 8.3 MG/DL — LOW (ref 8.4–10.5)
CHLORIDE SERPL-SCNC: 109 MMOL/L — HIGH (ref 96–108)
CO2 SERPL-SCNC: 25 MMOL/L — SIGNIFICANT CHANGE UP (ref 22–31)
CREAT SERPL-MCNC: 0.65 MG/DL — SIGNIFICANT CHANGE UP (ref 0.5–1.3)
GLUCOSE SERPL-MCNC: 93 MG/DL — SIGNIFICANT CHANGE UP (ref 70–99)
HCT VFR BLD CALC: 35.8 % — LOW (ref 39–50)
HGB BLD-MCNC: 11.4 G/DL — LOW (ref 13–17)
MAGNESIUM SERPL-MCNC: 1.8 MG/DL — SIGNIFICANT CHANGE UP (ref 1.6–2.6)
MCHC RBC-ENTMCNC: 29.2 PG — SIGNIFICANT CHANGE UP (ref 27–34)
MCHC RBC-ENTMCNC: 31.8 G/DL — LOW (ref 32–36)
MCV RBC AUTO: 91.8 FL — SIGNIFICANT CHANGE UP (ref 80–100)
PLATELET # BLD AUTO: 213 K/UL — SIGNIFICANT CHANGE UP (ref 150–400)
POTASSIUM SERPL-MCNC: 3.4 MMOL/L — LOW (ref 3.5–5.3)
POTASSIUM SERPL-SCNC: 3.4 MMOL/L — LOW (ref 3.5–5.3)
RBC # BLD: 3.9 M/UL — LOW (ref 4.2–5.8)
RBC # FLD: 14.9 % — SIGNIFICANT CHANGE UP (ref 10.3–16.9)
SODIUM SERPL-SCNC: 143 MMOL/L — SIGNIFICANT CHANGE UP (ref 135–145)
WBC # BLD: 5.6 K/UL — SIGNIFICANT CHANGE UP (ref 3.8–10.5)
WBC # FLD AUTO: 5.6 K/UL — SIGNIFICANT CHANGE UP (ref 3.8–10.5)

## 2018-04-14 PROCEDURE — 99233 SBSQ HOSP IP/OBS HIGH 50: CPT | Mod: GC

## 2018-04-14 RX ORDER — MAGNESIUM SULFATE 500 MG/ML
1 VIAL (ML) INJECTION ONCE
Qty: 0 | Refills: 0 | Status: COMPLETED | OUTPATIENT
Start: 2018-04-14 | End: 2018-04-14

## 2018-04-14 RX ORDER — POTASSIUM CHLORIDE 20 MEQ
10 PACKET (EA) ORAL
Qty: 0 | Refills: 0 | Status: COMPLETED | OUTPATIENT
Start: 2018-04-14 | End: 2018-04-14

## 2018-04-14 RX ADMIN — AMPICILLIN SODIUM AND SULBACTAM SODIUM 200 GRAM(S): 250; 125 INJECTION, POWDER, FOR SUSPENSION INTRAMUSCULAR; INTRAVENOUS at 13:57

## 2018-04-14 RX ADMIN — HEPARIN SODIUM 5000 UNIT(S): 5000 INJECTION INTRAVENOUS; SUBCUTANEOUS at 07:29

## 2018-04-14 RX ADMIN — Medication 100 MILLIEQUIVALENT(S): at 09:27

## 2018-04-14 RX ADMIN — HALOPERIDOL DECANOATE 0.5 MILLIGRAM(S): 100 INJECTION INTRAMUSCULAR at 13:57

## 2018-04-14 RX ADMIN — QUETIAPINE FUMARATE 25 MILLIGRAM(S): 200 TABLET, FILM COATED ORAL at 23:32

## 2018-04-14 RX ADMIN — HALOPERIDOL DECANOATE 0.5 MILLIGRAM(S): 100 INJECTION INTRAMUSCULAR at 23:32

## 2018-04-14 RX ADMIN — AMPICILLIN SODIUM AND SULBACTAM SODIUM 200 GRAM(S): 250; 125 INJECTION, POWDER, FOR SUSPENSION INTRAMUSCULAR; INTRAVENOUS at 18:11

## 2018-04-14 RX ADMIN — AMPICILLIN SODIUM AND SULBACTAM SODIUM 200 GRAM(S): 250; 125 INJECTION, POWDER, FOR SUSPENSION INTRAMUSCULAR; INTRAVENOUS at 01:28

## 2018-04-14 RX ADMIN — SODIUM CHLORIDE 80 MILLILITER(S): 9 INJECTION, SOLUTION INTRAVENOUS at 07:29

## 2018-04-14 RX ADMIN — HEPARIN SODIUM 5000 UNIT(S): 5000 INJECTION INTRAVENOUS; SUBCUTANEOUS at 23:32

## 2018-04-14 RX ADMIN — SODIUM CHLORIDE 80 MILLILITER(S): 9 INJECTION, SOLUTION INTRAVENOUS at 15:43

## 2018-04-14 RX ADMIN — Medication 100 MILLIEQUIVALENT(S): at 12:07

## 2018-04-14 RX ADMIN — Medication 100 MILLIEQUIVALENT(S): at 11:05

## 2018-04-14 RX ADMIN — AMPICILLIN SODIUM AND SULBACTAM SODIUM 200 GRAM(S): 250; 125 INJECTION, POWDER, FOR SUSPENSION INTRAMUSCULAR; INTRAVENOUS at 07:29

## 2018-04-14 RX ADMIN — SODIUM CHLORIDE 80 MILLILITER(S): 9 INJECTION, SOLUTION INTRAVENOUS at 23:32

## 2018-04-14 RX ADMIN — HEPARIN SODIUM 5000 UNIT(S): 5000 INJECTION INTRAVENOUS; SUBCUTANEOUS at 13:59

## 2018-04-14 RX ADMIN — Medication 100 GRAM(S): at 15:41

## 2018-04-14 RX ADMIN — HALOPERIDOL DECANOATE 0.5 MILLIGRAM(S): 100 INJECTION INTRAMUSCULAR at 07:29

## 2018-04-14 NOTE — PROGRESS NOTE ADULT - PROBLEM SELECTOR PLAN 9
Heparin SQ 5000units Q8hrs  No indication for stress ulcer ppx at this time  DNR/DNI- confirmed with HCP daughter Jane Logan [phone]  Dispo: [facility] family considering home hospice

## 2018-04-14 NOTE — PROGRESS NOTE ADULT - PROBLEM SELECTOR PLAN 8
NPO awaiting family decision on diet/comfort feeds  Continue D5 LR at 80mL/ hour for hydration as is NPO  Replete lytes to keep K>4 and Mg>2

## 2018-04-14 NOTE — PROGRESS NOTE ADULT - PROBLEM SELECTOR PLAN 3
Patient with history of clearing throat and coughing when eating, holding food in mouth, only able to drink thin liquids and raw fish. Concern for aspiration  -S&S said the patient was likely aspirating on all consistencies, would recommend NPO vs pureed thin liquids with known aspiration, but will discuss with family if they want comfort feeds. At the moment, they are thinking it over  - Aspiration precautions

## 2018-04-14 NOTE — PROGRESS NOTE ADULT - PROBLEM SELECTOR PLAN 4
Acute respiratory alkalosis: patient was tachypneic to the 40's 4/12 AM. 4/13 resolved after haldol, seroquel and tylenol.   -placed on bipap clear lungs  -ABG show respiratory alkalosis and worsened on bipap  -most likely etiology is pain vs agitation  -lactate elevated to 2.6 in AM on repeat <2  -give 1g IV tylenol or PO tylenol PRN as patient responded well yesterday. patient unable to verbalize pain.   -haldol 0.5 PO Q8 hours and seroquel 25mg at night per geriatric psychiatry

## 2018-04-14 NOTE — PROGRESS NOTE ADULT - PROBLEM SELECTOR PLAN 1
2/4 SIRS criteria (bandemia, tachycardia), with pneumonia on hx and cxr. Patient with productive cough for three days, change in mental status, and bandemia to 37% and CXR with RLL consolidation suspicious for pneumonia. Patient has some clearing of throat with food per daughter and HHA. lethargy and weight loss are likely linked to aspiration.  - Unasyn 3 gm IV q6hrs day 3  - Aspiration precautions  -RVP negative  -S&S said the patient was likely aspirating on all consistencies, would recommend NPO vs pureed thin liquids with known aspiration, but if the patient should go to home hospice and the family could decide if they would like to comfort feed

## 2018-04-14 NOTE — PROGRESS NOTE ADULT - PROBLEM SELECTOR PLAN 5
Patient with history of advanced dementia, baseline interactive, stands and walks with assistance, has been lethargic lately. AOx0 per daughter and HHA, but A&Ox1 here to first name  - home meds of Trazadone and haldol being held   -geriatric psych recommending haldol 0.5 PO Q 8 hours and seroquel 25mg QHS.   - EKG for qtc 440  - Fall precautions  - Low threshold for enhanced obs if patient is attempting to get up  - PT eval; home PT  - Palliative consult for advanced dementia, following and discussions about home hospice  - vitamin D wnl

## 2018-04-14 NOTE — PROGRESS NOTE ADULT - ASSESSMENT
Patient is an 89 yo M with history of advanced dementia who presents with cough, admitted with RLL infiltrate on CXR likely 2/2 to aspiration pneumonia on unasyn with palliative following for goals of care discussion

## 2018-04-14 NOTE — PROGRESS NOTE ADULT - SUBJECTIVE AND OBJECTIVE BOX
INTERVAL HPI/OVERNIGHT EVENTS: The patient had no acute events overnight. the patient has no complaints this morning and when asked if he is feeling well he says yes. He is minimally verbal    VITAL SIGNS:  T(F): 98 (18 @ 08:46)  HR: 68 (18 @ 08:46)  BP: 120/77 (18 @ 08:46)  RR: 18 (18 @ 08:46)  SpO2: 98% (18 @ 08:46)  Wt(kg): --    PHYSICAL EXAM:    Constitutional: Well developed, nourished  General: laying comfortably  ENMT: dry mucous membranes, no mucosal pallor  Neck: supple  Respiratory: CTABL, no rales, no crackles, no wheezing  Cardiovascular: +S1, +S2, no murmurs, rubs or gallops, regular rate and rhythm  Gastrointestinal: abdomen soft, non distended, non tender, +BS  Extremities: no edema, no calf pain to palpation  Vascular: cap refill <3s in all extremities, radial and DP pulses 2+  Neurological: AAO x1 to name  Skin: no rashes    MEDICATIONS  (STANDING):  ampicillin/sulbactam  IVPB 3 Gram(s) IV Intermittent every 6 hours  dextrose 5% + lactated ringers. 1000 milliLiter(s) (80 mL/Hr) IV Continuous <Continuous>  haloperidol     Tablet 0.5 milliGRAM(s) Oral every 8 hours  heparin  Injectable 5000 Unit(s) SubCutaneous every 8 hours  magnesium sulfate  IVPB 1 Gram(s) IV Intermittent once  potassium chloride  10 mEq/100 mL IVPB 10 milliEquivalent(s) IV Intermittent every 1 hour  QUEtiapine 25 milliGRAM(s) Oral at bedtime    MEDICATIONS  (PRN):  acetaminophen   Tablet. 650 milliGRAM(s) Oral every 6 hours PRN Moderate Pain (4 - 6)      Allergies    No Known Drug Allergies  Nuts (Anaphylaxis)  seeds (Anaphylaxis)    Intolerances        LABS:                        11.4   5.6   )-----------( 213      ( 2018 07:12 )             35.8     -14    143  |  109<H>  |  12  ----------------------------<  93  3.4<L>   |  25  |  0.65    Ca    8.3<L>      2018 07:12  Phos  2.7     04-13  Mg     1.8     04-14        Urinalysis Basic - ( 2018 15:07 )    Color: Yellow / Appearance: SL Cloudy / S.020 / pH: x  Gluc: x / Ketone: 15 mg/dL  / Bili: Negative / Urobili: 4.0 E.U./dL   Blood: x / Protein: Trace mg/dL / Nitrite: NEGATIVE   Leuk Esterase: NEGATIVE / RBC: x / WBC < 5 /HPF   Sq Epi: x / Non Sq Epi: 0-5 /HPF / Bacteria: Present /HPF        RADIOLOGY & ADDITIONAL TESTS: Reviewed.

## 2018-04-15 DIAGNOSIS — R53.2 FUNCTIONAL QUADRIPLEGIA: ICD-10-CM

## 2018-04-15 PROCEDURE — 99233 SBSQ HOSP IP/OBS HIGH 50: CPT | Mod: GC

## 2018-04-15 RX ADMIN — AMPICILLIN SODIUM AND SULBACTAM SODIUM 200 GRAM(S): 250; 125 INJECTION, POWDER, FOR SUSPENSION INTRAMUSCULAR; INTRAVENOUS at 18:23

## 2018-04-15 RX ADMIN — HEPARIN SODIUM 5000 UNIT(S): 5000 INJECTION INTRAVENOUS; SUBCUTANEOUS at 13:20

## 2018-04-15 RX ADMIN — AMPICILLIN SODIUM AND SULBACTAM SODIUM 200 GRAM(S): 250; 125 INJECTION, POWDER, FOR SUSPENSION INTRAMUSCULAR; INTRAVENOUS at 13:20

## 2018-04-15 RX ADMIN — QUETIAPINE FUMARATE 25 MILLIGRAM(S): 200 TABLET, FILM COATED ORAL at 22:23

## 2018-04-15 RX ADMIN — SODIUM CHLORIDE 80 MILLILITER(S): 9 INJECTION, SOLUTION INTRAVENOUS at 07:33

## 2018-04-15 RX ADMIN — HALOPERIDOL DECANOATE 0.5 MILLIGRAM(S): 100 INJECTION INTRAMUSCULAR at 07:05

## 2018-04-15 RX ADMIN — AMPICILLIN SODIUM AND SULBACTAM SODIUM 200 GRAM(S): 250; 125 INJECTION, POWDER, FOR SUSPENSION INTRAMUSCULAR; INTRAVENOUS at 00:35

## 2018-04-15 RX ADMIN — HEPARIN SODIUM 5000 UNIT(S): 5000 INJECTION INTRAVENOUS; SUBCUTANEOUS at 07:06

## 2018-04-15 RX ADMIN — HALOPERIDOL DECANOATE 0.5 MILLIGRAM(S): 100 INJECTION INTRAMUSCULAR at 13:20

## 2018-04-15 RX ADMIN — HALOPERIDOL DECANOATE 0.5 MILLIGRAM(S): 100 INJECTION INTRAMUSCULAR at 22:23

## 2018-04-15 RX ADMIN — HEPARIN SODIUM 5000 UNIT(S): 5000 INJECTION INTRAVENOUS; SUBCUTANEOUS at 22:23

## 2018-04-15 RX ADMIN — SODIUM CHLORIDE 80 MILLILITER(S): 9 INJECTION, SOLUTION INTRAVENOUS at 18:23

## 2018-04-15 RX ADMIN — AMPICILLIN SODIUM AND SULBACTAM SODIUM 200 GRAM(S): 250; 125 INJECTION, POWDER, FOR SUSPENSION INTRAMUSCULAR; INTRAVENOUS at 07:06

## 2018-04-15 NOTE — PROGRESS NOTE ADULT - SUBJECTIVE AND OBJECTIVE BOX
Pt seen and examined by me at bedside earlier in AM  family wishes to do comfort feed. Education provided to Aid at bedside    VSS  comfortable  relative CTA b/l anterior  +bs/nt/nd    no labs ordered

## 2018-04-15 NOTE — PROGRESS NOTE ADULT - PROBLEM SELECTOR PLAN 3
Advanced dementia, palliative following  MOLST form signed and in chart  family wishes to start comfort feed

## 2018-04-16 VITALS — HEART RATE: 70 BPM | DIASTOLIC BLOOD PRESSURE: 70 MMHG | SYSTOLIC BLOOD PRESSURE: 117 MMHG | OXYGEN SATURATION: 95 %

## 2018-04-16 LAB
ANION GAP SERPL CALC-SCNC: 14 MMOL/L — SIGNIFICANT CHANGE UP (ref 5–17)
BUN SERPL-MCNC: 9 MG/DL — SIGNIFICANT CHANGE UP (ref 7–23)
CALCIUM SERPL-MCNC: 8.3 MG/DL — LOW (ref 8.4–10.5)
CHLORIDE SERPL-SCNC: 103 MMOL/L — SIGNIFICANT CHANGE UP (ref 96–108)
CO2 SERPL-SCNC: 24 MMOL/L — SIGNIFICANT CHANGE UP (ref 22–31)
CREAT SERPL-MCNC: 0.53 MG/DL — SIGNIFICANT CHANGE UP (ref 0.5–1.3)
CULTURE RESULTS: SIGNIFICANT CHANGE UP
CULTURE RESULTS: SIGNIFICANT CHANGE UP
GLUCOSE SERPL-MCNC: 98 MG/DL — SIGNIFICANT CHANGE UP (ref 70–99)
HCT VFR BLD CALC: 37.3 % — LOW (ref 39–50)
HGB BLD-MCNC: 12.2 G/DL — LOW (ref 13–17)
MAGNESIUM SERPL-MCNC: 2.1 MG/DL — SIGNIFICANT CHANGE UP (ref 1.6–2.6)
MCHC RBC-ENTMCNC: 29.3 PG — SIGNIFICANT CHANGE UP (ref 27–34)
MCHC RBC-ENTMCNC: 32.7 G/DL — SIGNIFICANT CHANGE UP (ref 32–36)
MCV RBC AUTO: 89.4 FL — SIGNIFICANT CHANGE UP (ref 80–100)
PLATELET # BLD AUTO: 242 K/UL — SIGNIFICANT CHANGE UP (ref 150–400)
POTASSIUM SERPL-MCNC: 3.4 MMOL/L — LOW (ref 3.5–5.3)
POTASSIUM SERPL-SCNC: 3.4 MMOL/L — LOW (ref 3.5–5.3)
RBC # BLD: 4.17 M/UL — LOW (ref 4.2–5.8)
RBC # FLD: 14.5 % — SIGNIFICANT CHANGE UP (ref 10.3–16.9)
SODIUM SERPL-SCNC: 141 MMOL/L — SIGNIFICANT CHANGE UP (ref 135–145)
SPECIMEN SOURCE: SIGNIFICANT CHANGE UP
SPECIMEN SOURCE: SIGNIFICANT CHANGE UP
WBC # BLD: 4.9 K/UL — SIGNIFICANT CHANGE UP (ref 3.8–10.5)
WBC # FLD AUTO: 4.9 K/UL — SIGNIFICANT CHANGE UP (ref 3.8–10.5)

## 2018-04-16 PROCEDURE — 82962 GLUCOSE BLOOD TEST: CPT

## 2018-04-16 PROCEDURE — 82803 BLOOD GASES ANY COMBINATION: CPT

## 2018-04-16 PROCEDURE — 87040 BLOOD CULTURE FOR BACTERIA: CPT

## 2018-04-16 PROCEDURE — 94660 CPAP INITIATION&MGMT: CPT

## 2018-04-16 PROCEDURE — 82306 VITAMIN D 25 HYDROXY: CPT

## 2018-04-16 PROCEDURE — 85027 COMPLETE CBC AUTOMATED: CPT

## 2018-04-16 PROCEDURE — 86780 TREPONEMA PALLIDUM: CPT

## 2018-04-16 PROCEDURE — 87581 M.PNEUMON DNA AMP PROBE: CPT

## 2018-04-16 PROCEDURE — 99239 HOSP IP/OBS DSCHRG MGMT >30: CPT

## 2018-04-16 PROCEDURE — 92610 EVALUATE SWALLOWING FUNCTION: CPT | Mod: GN

## 2018-04-16 PROCEDURE — 96374 THER/PROPH/DIAG INJ IV PUSH: CPT

## 2018-04-16 PROCEDURE — 84443 ASSAY THYROID STIM HORMONE: CPT

## 2018-04-16 PROCEDURE — 85025 COMPLETE CBC W/AUTO DIFF WBC: CPT

## 2018-04-16 PROCEDURE — 85610 PROTHROMBIN TIME: CPT

## 2018-04-16 PROCEDURE — 80053 COMPREHEN METABOLIC PANEL: CPT

## 2018-04-16 PROCEDURE — 99233 SBSQ HOSP IP/OBS HIGH 50: CPT

## 2018-04-16 PROCEDURE — 97530 THERAPEUTIC ACTIVITIES: CPT

## 2018-04-16 PROCEDURE — 87633 RESP VIRUS 12-25 TARGETS: CPT

## 2018-04-16 PROCEDURE — 71045 X-RAY EXAM CHEST 1 VIEW: CPT

## 2018-04-16 PROCEDURE — 80048 BASIC METABOLIC PNL TOTAL CA: CPT

## 2018-04-16 PROCEDURE — 99285 EMERGENCY DEPT VISIT HI MDM: CPT | Mod: 25

## 2018-04-16 PROCEDURE — 82607 VITAMIN B-12: CPT

## 2018-04-16 PROCEDURE — 84100 ASSAY OF PHOSPHORUS: CPT

## 2018-04-16 PROCEDURE — 97110 THERAPEUTIC EXERCISES: CPT

## 2018-04-16 PROCEDURE — 97162 PT EVAL MOD COMPLEX 30 MIN: CPT

## 2018-04-16 PROCEDURE — 83735 ASSAY OF MAGNESIUM: CPT

## 2018-04-16 PROCEDURE — 71046 X-RAY EXAM CHEST 2 VIEWS: CPT

## 2018-04-16 PROCEDURE — 85730 THROMBOPLASTIN TIME PARTIAL: CPT

## 2018-04-16 PROCEDURE — 84134 ASSAY OF PREALBUMIN: CPT

## 2018-04-16 PROCEDURE — 84436 ASSAY OF TOTAL THYROXINE: CPT

## 2018-04-16 PROCEDURE — 81001 URINALYSIS AUTO W/SCOPE: CPT

## 2018-04-16 PROCEDURE — 96375 TX/PRO/DX INJ NEW DRUG ADDON: CPT

## 2018-04-16 PROCEDURE — 83605 ASSAY OF LACTIC ACID: CPT

## 2018-04-16 PROCEDURE — 93005 ELECTROCARDIOGRAM TRACING: CPT

## 2018-04-16 PROCEDURE — 87486 CHLMYD PNEUM DNA AMP PROBE: CPT

## 2018-04-16 PROCEDURE — 87449 NOS EACH ORGANISM AG IA: CPT

## 2018-04-16 PROCEDURE — 87798 DETECT AGENT NOS DNA AMP: CPT

## 2018-04-16 PROCEDURE — 36415 COLL VENOUS BLD VENIPUNCTURE: CPT

## 2018-04-16 RX ORDER — HALOPERIDOL DECANOATE 100 MG/ML
1 INJECTION INTRAMUSCULAR
Qty: 90 | Refills: 0 | OUTPATIENT
Start: 2018-04-16 | End: 2018-05-15

## 2018-04-16 RX ORDER — POTASSIUM CHLORIDE 20 MEQ
10 PACKET (EA) ORAL
Qty: 0 | Refills: 0 | Status: DISCONTINUED | OUTPATIENT
Start: 2018-04-16 | End: 2018-04-16

## 2018-04-16 RX ORDER — AMPICILLIN SODIUM AND SULBACTAM SODIUM 250; 125 MG/ML; MG/ML
3 INJECTION, POWDER, FOR SUSPENSION INTRAMUSCULAR; INTRAVENOUS
Qty: 0 | Refills: 0 | COMMUNITY
Start: 2018-04-16

## 2018-04-16 RX ORDER — QUETIAPINE FUMARATE 200 MG/1
1 TABLET, FILM COATED ORAL
Qty: 0 | Refills: 0 | COMMUNITY
Start: 2018-04-16

## 2018-04-16 RX ORDER — ACETAMINOPHEN 500 MG
2 TABLET ORAL
Qty: 0 | Refills: 0 | COMMUNITY
Start: 2018-04-16

## 2018-04-16 RX ORDER — TRAZODONE HCL 50 MG
2 TABLET ORAL
Qty: 0 | Refills: 0 | COMMUNITY

## 2018-04-16 RX ORDER — QUETIAPINE FUMARATE 200 MG/1
1 TABLET, FILM COATED ORAL
Qty: 30 | Refills: 0 | OUTPATIENT
Start: 2018-04-16 | End: 2018-05-15

## 2018-04-16 RX ORDER — HALOPERIDOL DECANOATE 100 MG/ML
1 INJECTION INTRAMUSCULAR
Qty: 0 | Refills: 0 | COMMUNITY
Start: 2018-04-16

## 2018-04-16 RX ORDER — HALOPERIDOL DECANOATE 100 MG/ML
1 INJECTION INTRAMUSCULAR
Qty: 0 | Refills: 0 | COMMUNITY

## 2018-04-16 RX ADMIN — AMPICILLIN SODIUM AND SULBACTAM SODIUM 200 GRAM(S): 250; 125 INJECTION, POWDER, FOR SUSPENSION INTRAMUSCULAR; INTRAVENOUS at 00:59

## 2018-04-16 RX ADMIN — HALOPERIDOL DECANOATE 0.5 MILLIGRAM(S): 100 INJECTION INTRAMUSCULAR at 06:51

## 2018-04-16 RX ADMIN — HALOPERIDOL DECANOATE 0.5 MILLIGRAM(S): 100 INJECTION INTRAMUSCULAR at 13:17

## 2018-04-16 RX ADMIN — Medication 100 MILLIEQUIVALENT(S): at 10:22

## 2018-04-16 RX ADMIN — AMPICILLIN SODIUM AND SULBACTAM SODIUM 200 GRAM(S): 250; 125 INJECTION, POWDER, FOR SUSPENSION INTRAMUSCULAR; INTRAVENOUS at 06:51

## 2018-04-16 RX ADMIN — Medication 100 MILLIEQUIVALENT(S): at 07:37

## 2018-04-16 RX ADMIN — HEPARIN SODIUM 5000 UNIT(S): 5000 INJECTION INTRAVENOUS; SUBCUTANEOUS at 06:51

## 2018-04-16 NOTE — PROGRESS NOTE ADULT - PROBLEM SELECTOR PLAN 3
Has strong features of parkinsons' dementia based on clinical features: shuffling gait, frequent falls, length of disease. Presently requires enhanced supervision and 24/7 HHA (has private pay aide in pvt apt). See note above  did have dose of seroquel and tylenol providing good rest from 9p-1a an reduced agitation.  recommend seroquel 25mg QHS and Tylenol before bed (oral-liquid)    Will continue this regimen at home

## 2018-04-16 NOTE — PROGRESS NOTE ADULT - PROBLEM SELECTOR PLAN 1
chronicity of aspiration in ad. dementia and Parkinson's symptoms discussed with daughter:  She understands patient likely to re-aspirate, has been aspirating silently and intubation nor feeding tube will stop that illness trajectory.  Has decided to c/w small spoon feeds as tolerated, hospice care presented in an effort to treat patient along comfort care measures and avoid further hospitalizations for inevitable recurrence of Asp Pna    Clinically improved, although continues to require nasal o2  Long conversation counseling daughter about the lack of utility of feeding tube- continued risk of aspiration, risk of diarrhea, concern that he may try to pull out feeding tube.  Provided daughter with information pamphlet, and also one to send to her daughter in [place]

## 2018-04-16 NOTE — PROGRESS NOTE ADULT - PROBLEM SELECTOR PLAN 2
c/w supervision at all times and redirection.  PT has evaluated: d/w daughter, will go home with home PT visits.  nigel-psych consult with Dr. Ramirez reassess psych med regimen to minimize iatrogenic influence of imbalance. psych meds adjusted, which might improve his gait

## 2018-04-16 NOTE — PROGRESS NOTE ADULT - SUBJECTIVE AND OBJECTIVE BOX
KATIE LOZA   MRN-[id number]    CC: confusion in setting of dementia and recent aspiration pneumonia    Subjective: Patient admitted with aspiration pneumonia, completing course of antibiotics.  Episodes of tachypnea seem related to anxiety.  Meds for agitation adjusted by Dr. Ramirez on Friday- with discontinuation of Trazadone, addition of Seroquel and reduction in Haldol.  Daughter thinks he is a bit clearer, although still wants to get OOB.    Daughter reports that he ate some breakfast whixh she cooked and brought in.  Appetite seems poor.  She has questions regarding feeding tube    DYSPNEA: Y / N	  N  NAUS/VOM: Y / N	N  SECRETIONS: Y / N	N  AGITATION: Y / N   episodic  Pain (Y/N):     does not answer specific question, pain does not appear to be physical  -Provocation/Palliation:  -Quality/Quantity:  -Radiating:  -Severity:  -Timing/Frequency:  -Impact on ADLs:    OTHER REVIEW OF SYSTEMS:  UNABLE TO OBTAIN  due to: advanced dementia    PEx:  T(C): 36.2 (04-16-18 @ 09:17), Max: 37.1 (04-16-18 @ 05:29)  HR: 70 (04-16-18 @ 10:20) (58 - 70)  BP: 117/70 (04-16-18 @ 10:20) (110/74 - 138/84)  RR: 17 (04-16-18 @ 09:17) (12 - 20)  SpO2: 95% (04-16-18 @ 10:20) (93% - 98%)  Wt(kg): --    GENERAL:  elderly male resting in bed, frequently tries to get OOB  HEENT:  At/NC  Neck: no thryomegaly  CVS: s1s2  Resp: + Nasal cannula+ rhonchi R> L + cough, bilateral air entry  GI: + soft, + BS  : condom catheter  Musc:   strength 5/5 all 4 extremitites  Neuro: + dementia, nonverbal, no focal deficits   Psych:   calm  Skin:   grossly intact, bruises noted from previous falls  Lymph: no gross adenopathy  Preadmit Karnofsky: 40 %           Current Karnofsky:  30 %  Cachexia (Y/N): Y  BMI: weight assessed interim since initial consult, BMI 20%, has had weight loss of approx 30 lbs over past 6-8 weeks      	     No Known Drug Allergies  Nuts (Anaphylaxis)  seeds (Anaphylaxis)      OPIATE NAÏVE (Y/N):  iSTOP REVIEWED (Y/N):     MEDICATIONS: REVIEWED  MEDICATIONS  (STANDING):  ampicillin/sulbactam  IVPB 3 Gram(s) IV Intermittent every 6 hours  haloperidol     Tablet 0.5 milliGRAM(s) Oral every 8 hours  QUEtiapine 25 milliGRAM(s) Oral at bedtime    MEDICATIONS  (PRN):  acetaminophen   Tablet. 650 milliGRAM(s) Oral every 6 hours PRN Moderate Pain (4 - 6)      LABS: REVIEWED                        12.2   4.9   )-----------( 242      ( 16 Apr 2018 06:17 )             37.3           16 Apr 2018 06:17    141    |  103    |  9      ----------------------------<  98     3.4     |  24     |  0.53     Ca    8.3        16 Apr 2018 06:17  Mg     2.1       16 Apr 2018 06:17    IMAGING: REVIEWED    ADVANCED DIRECTIVES:         DNR     DNI         MOLST completed    DECISION MAKER: katie at bedside  LEGAL SURROGATE:    PSYCHOSOCIAL-SPIRITUAL ASSESSMENT:       Reviewed       Care plan unchanged      GOALS OF CARE DISCUSSION       Palliative care info/counseling provided	               Documentation of GOC:  comfort. no rehospitalization  home with hospice  	      AGENCY CHOICE DISCUSSED:     [facility] hospice

## 2018-04-16 NOTE — PROGRESS NOTE ADULT - PROBLEM SELECTOR PLAN 4
Patient home hospice eligible based on marked decline in functional status over past two months, greater than 10% body weight loss over past 6-8 weeks with albumin of 2.3, aspiration pneumonia, progressive cognitive decline.    d/w mohamud and primary team; c/w Haldol 0.5mg TID + Seroquel 25mg QHS,  stop trazodone    Patient going home today with home hospice

## 2018-04-18 DIAGNOSIS — R53.2 FUNCTIONAL QUADRIPLEGIA: ICD-10-CM

## 2018-04-18 DIAGNOSIS — E88.89 OTHER SPECIFIED METABOLIC DISORDERS: ICD-10-CM

## 2018-04-18 DIAGNOSIS — G20 PARKINSON'S DISEASE: ICD-10-CM

## 2018-04-18 DIAGNOSIS — R26.9 UNSPECIFIED ABNORMALITIES OF GAIT AND MOBILITY: ICD-10-CM

## 2018-04-18 DIAGNOSIS — A41.9 SEPSIS, UNSPECIFIED ORGANISM: ICD-10-CM

## 2018-04-18 DIAGNOSIS — J96.01 ACUTE RESPIRATORY FAILURE WITH HYPOXIA: ICD-10-CM

## 2018-04-18 DIAGNOSIS — F02.81 DEMENTIA IN OTHER DISEASES CLASSIFIED ELSEWHERE, UNSPECIFIED SEVERITY, WITH BEHAVIORAL DISTURBANCE: ICD-10-CM

## 2018-04-18 DIAGNOSIS — Z91.81 HISTORY OF FALLING: ICD-10-CM

## 2018-04-18 DIAGNOSIS — E86.0 DEHYDRATION: ICD-10-CM

## 2018-04-18 DIAGNOSIS — E87.3 ALKALOSIS: ICD-10-CM

## 2018-04-18 DIAGNOSIS — Z51.5 ENCOUNTER FOR PALLIATIVE CARE: ICD-10-CM

## 2018-04-18 DIAGNOSIS — Z87.891 PERSONAL HISTORY OF NICOTINE DEPENDENCE: ICD-10-CM

## 2018-04-18 DIAGNOSIS — E43 UNSPECIFIED SEVERE PROTEIN-CALORIE MALNUTRITION: ICD-10-CM

## 2018-04-18 DIAGNOSIS — Z66 DO NOT RESUSCITATE: ICD-10-CM

## 2018-04-18 DIAGNOSIS — R62.7 ADULT FAILURE TO THRIVE: ICD-10-CM

## 2018-04-18 DIAGNOSIS — R29.6 REPEATED FALLS: ICD-10-CM

## 2018-04-18 DIAGNOSIS — J69.0 PNEUMONITIS DUE TO INHALATION OF FOOD AND VOMIT: ICD-10-CM

## 2019-04-11 NOTE — PROGRESS NOTE ADULT - PROBLEM SELECTOR PLAN 7
Fall precautions, PT as above.  - f/u CT Head  - f/u B12, rpr, TSH, t4 Fall precautions, PT as above.  - d/c CT Head as patient has no signs of head trauma  - B12, TSH, t4 wnl  -f/u RPR non-weight bearing left

## 2020-02-03 NOTE — PROGRESS NOTE ADULT - SUBJECTIVE AND OBJECTIVE BOX
Patient called requesting refill of Pravastatin. Patient stated he has 1 refill of #30 left, but would appreciate #90 with at least one refill, till he comes in around July 2020 for AWV. Patient stated he will call back to schedule, due to possible back SX.      Last seen 7/16/2019  Next appt Visit date not found  Tony Advance care planning meeting held by Dr. Libby Gomez with HCP of Greg Loza  Start time:  1:00 PM  End time:     1:30 PM  Total time:  30 minutes    A face to face meeting to discuss advance care planning was held today regarding: GREG LOZA  Primary decision maker: Jane Correa, daughter and HCP, nadeen Frank  Alternate/surrogate:  Discussed advance directives including, but not limited to, healthcare proxy and code status.  Decision regarding code status: patient previously DNR/DNI but this is reconfirmed  Documentation completed today:  MOLST completed, patient to return home with hospice, no feeding tube (lack of benefit in patient with progressive and advanced dementia discussed), DNR/DNI

## 2025-01-28 NOTE — DIETITIAN INITIAL EVALUATION ADULT. - PROBLEM SELECTOR PROBLEM 5
No care due was identified.  Health [facility] Embedded Care Due Messages. Reference number: [id number].   1/28/2025 2:36:04 PM CST   R/O Aspiration of food